# Patient Record
Sex: FEMALE | Race: WHITE | Employment: OTHER | URBAN - METROPOLITAN AREA
[De-identification: names, ages, dates, MRNs, and addresses within clinical notes are randomized per-mention and may not be internally consistent; named-entity substitution may affect disease eponyms.]

---

## 2018-01-15 ENCOUNTER — TELEPHONE (OUTPATIENT)
Dept: INTERNAL MEDICINE CLINIC | Age: 83
End: 2018-01-15

## 2018-03-19 ENCOUNTER — TELEPHONE (OUTPATIENT)
Dept: INTERNAL MEDICINE CLINIC | Age: 83
End: 2018-03-19

## 2018-03-20 ENCOUNTER — OFFICE VISIT (OUTPATIENT)
Dept: INTERNAL MEDICINE CLINIC | Age: 83
End: 2018-03-20

## 2018-03-20 VITALS
BODY MASS INDEX: 23.69 KG/M2 | SYSTOLIC BLOOD PRESSURE: 130 MMHG | HEART RATE: 72 BPM | HEIGHT: 57 IN | WEIGHT: 109.8 LBS | DIASTOLIC BLOOD PRESSURE: 72 MMHG

## 2018-03-20 DIAGNOSIS — Z13.31 POSITIVE DEPRESSION SCREENING: ICD-10-CM

## 2018-03-20 DIAGNOSIS — F33.2 SEVERE EPISODE OF RECURRENT MAJOR DEPRESSIVE DISORDER, WITHOUT PSYCHOTIC FEATURES (HCC): Primary | ICD-10-CM

## 2018-03-20 PROCEDURE — 1123F ACP DISCUSS/DSCN MKR DOCD: CPT | Performed by: INTERNAL MEDICINE

## 2018-03-20 PROCEDURE — G8431 POS CLIN DEPRES SCRN F/U DOC: HCPCS | Performed by: INTERNAL MEDICINE

## 2018-03-20 PROCEDURE — 1036F TOBACCO NON-USER: CPT | Performed by: INTERNAL MEDICINE

## 2018-03-20 PROCEDURE — G8484 FLU IMMUNIZE NO ADMIN: HCPCS | Performed by: INTERNAL MEDICINE

## 2018-03-20 PROCEDURE — 4040F PNEUMOC VAC/ADMIN/RCVD: CPT | Performed by: INTERNAL MEDICINE

## 2018-03-20 PROCEDURE — 99213 OFFICE O/P EST LOW 20 MIN: CPT | Performed by: INTERNAL MEDICINE

## 2018-03-20 PROCEDURE — G8427 DOCREV CUR MEDS BY ELIG CLIN: HCPCS | Performed by: INTERNAL MEDICINE

## 2018-03-20 PROCEDURE — G8420 CALC BMI NORM PARAMETERS: HCPCS | Performed by: INTERNAL MEDICINE

## 2018-03-20 PROCEDURE — 1090F PRES/ABSN URINE INCON ASSESS: CPT | Performed by: INTERNAL MEDICINE

## 2018-03-20 PROCEDURE — G0444 DEPRESSION SCREEN ANNUAL: HCPCS | Performed by: INTERNAL MEDICINE

## 2018-03-20 ASSESSMENT — PATIENT HEALTH QUESTIONNAIRE - PHQ9
1. LITTLE INTEREST OR PLEASURE IN DOING THINGS: 2
5. POOR APPETITE OR OVEREATING: 1
9. THOUGHTS THAT YOU WOULD BE BETTER OFF DEAD, OR OF HURTING YOURSELF: 3
4. FEELING TIRED OR HAVING LITTLE ENERGY: 0
8. MOVING OR SPEAKING SO SLOWLY THAT OTHER PEOPLE COULD HAVE NOTICED. OR THE OPPOSITE, BEING SO FIGETY OR RESTLESS THAT YOU HAVE BEEN MOVING AROUND A LOT MORE THAN USUAL: 0
6. FEELING BAD ABOUT YOURSELF - OR THAT YOU ARE A FAILURE OR HAVE LET YOURSELF OR YOUR FAMILY DOWN: 3
3. TROUBLE FALLING OR STAYING ASLEEP: 0
10. IF YOU CHECKED OFF ANY PROBLEMS, HOW DIFFICULT HAVE THESE PROBLEMS MADE IT FOR YOU TO DO YOUR WORK, TAKE CARE OF THINGS AT HOME, OR GET ALONG WITH OTHER PEOPLE: 2
7. TROUBLE CONCENTRATING ON THINGS, SUCH AS READING THE NEWSPAPER OR WATCHING TELEVISION: 3
2. FEELING DOWN, DEPRESSED OR HOPELESS: 3
SUM OF ALL RESPONSES TO PHQ QUESTIONS 1-9: 15
SUM OF ALL RESPONSES TO PHQ9 QUESTIONS 1 & 2: 5

## 2018-03-23 ENCOUNTER — TELEPHONE (OUTPATIENT)
Dept: INTERNAL MEDICINE CLINIC | Age: 83
End: 2018-03-23

## 2018-03-23 RX ORDER — QUETIAPINE FUMARATE 25 MG/1
25 TABLET, FILM COATED ORAL NIGHTLY
Qty: 30 TABLET | Refills: 1 | Status: SHIPPED | OUTPATIENT
Start: 2018-03-23
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
## 2018-03-26 ENCOUNTER — TELEPHONE (OUTPATIENT)
Dept: INTERNAL MEDICINE CLINIC | Age: 83
End: 2018-03-26

## 2018-04-05 ENCOUNTER — TELEPHONE (OUTPATIENT)
Dept: INTERNAL MEDICINE CLINIC | Age: 83
End: 2018-04-05

## 2018-04-09 ENCOUNTER — OFFICE VISIT (OUTPATIENT)
Dept: INTERNAL MEDICINE CLINIC | Age: 83
End: 2018-04-09

## 2018-04-09 VITALS
SYSTOLIC BLOOD PRESSURE: 122 MMHG | DIASTOLIC BLOOD PRESSURE: 60 MMHG | HEIGHT: 57 IN | HEART RATE: 84 BPM | WEIGHT: 103.8 LBS | BODY MASS INDEX: 22.39 KG/M2

## 2018-04-09 DIAGNOSIS — F03.91 DEMENTIA WITH BEHAVIORAL DISTURBANCE, UNSPECIFIED DEMENTIA TYPE: Primary | ICD-10-CM

## 2018-04-09 DIAGNOSIS — E21.3 HYPERPARATHYROIDISM (HCC): ICD-10-CM

## 2018-04-09 DIAGNOSIS — F33.1 MODERATE EPISODE OF RECURRENT MAJOR DEPRESSIVE DISORDER (HCC): ICD-10-CM

## 2018-04-09 PROCEDURE — 1036F TOBACCO NON-USER: CPT | Performed by: INTERNAL MEDICINE

## 2018-04-09 PROCEDURE — 1123F ACP DISCUSS/DSCN MKR DOCD: CPT | Performed by: INTERNAL MEDICINE

## 2018-04-09 PROCEDURE — 99214 OFFICE O/P EST MOD 30 MIN: CPT | Performed by: INTERNAL MEDICINE

## 2018-04-09 PROCEDURE — 1090F PRES/ABSN URINE INCON ASSESS: CPT | Performed by: INTERNAL MEDICINE

## 2018-04-09 PROCEDURE — 4040F PNEUMOC VAC/ADMIN/RCVD: CPT | Performed by: INTERNAL MEDICINE

## 2018-04-09 PROCEDURE — G8420 CALC BMI NORM PARAMETERS: HCPCS | Performed by: INTERNAL MEDICINE

## 2018-04-09 PROCEDURE — G8427 DOCREV CUR MEDS BY ELIG CLIN: HCPCS | Performed by: INTERNAL MEDICINE

## 2018-04-09 ASSESSMENT — ENCOUNTER SYMPTOMS
COUGH: 1
CONSTIPATION: 0

## 2018-05-03 ENCOUNTER — TELEPHONE (OUTPATIENT)
Dept: INTERNAL MEDICINE CLINIC | Age: 83
End: 2018-05-03

## 2018-10-26 ENCOUNTER — TELEPHONE (OUTPATIENT)
Dept: INTERNAL MEDICINE CLINIC | Age: 83
End: 2018-10-26

## 2018-10-26 NOTE — TELEPHONE ENCOUNTER
FLACO call from Linda Stearns states that pt have cellulitis on left lower leg, it is infected,warm, red and swollen. She states she may need antibiotic, or dressing???    She request call back.

## 2019-06-21 ENCOUNTER — APPOINTMENT (OUTPATIENT)
Dept: GENERAL RADIOLOGY | Age: 84
DRG: 536 | End: 2019-06-21
Payer: MEDICARE

## 2019-06-21 ENCOUNTER — APPOINTMENT (OUTPATIENT)
Dept: CT IMAGING | Age: 84
DRG: 536 | End: 2019-06-21
Payer: MEDICARE

## 2019-06-21 ENCOUNTER — TELEPHONE (OUTPATIENT)
Dept: ORTHOPEDIC SURGERY | Age: 84
End: 2019-06-21

## 2019-06-21 ENCOUNTER — HOSPITAL ENCOUNTER (INPATIENT)
Age: 84
LOS: 5 days | Discharge: NURSING, REHAB OR LONG-TERM CARE | DRG: 536 | End: 2019-06-26
Attending: FAMILY MEDICINE | Admitting: INTERNAL MEDICINE
Payer: MEDICARE

## 2019-06-21 DIAGNOSIS — S32.10XA CLOSED FRACTURE OF SACRUM, UNSPECIFIED PORTION OF SACRUM, INITIAL ENCOUNTER (HCC): ICD-10-CM

## 2019-06-21 DIAGNOSIS — M54.40 LOW BACK PAIN WITH SCIATICA, SCIATICA LATERALITY UNSPECIFIED, UNSPECIFIED BACK PAIN LATERALITY, UNSPECIFIED CHRONICITY: ICD-10-CM

## 2019-06-21 DIAGNOSIS — M25.561 ACUTE PAIN OF RIGHT KNEE: ICD-10-CM

## 2019-06-21 DIAGNOSIS — M25.522 LEFT ELBOW PAIN: ICD-10-CM

## 2019-06-21 DIAGNOSIS — Z92.29 PERSONAL HISTORY OF OTHER DRUG THERAPY: ICD-10-CM

## 2019-06-21 DIAGNOSIS — R52 UNCONTROLLED PAIN: ICD-10-CM
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
## 2019-06-21 DIAGNOSIS — S32.592A CLOSED FRACTURE OF SINGLE RAMUS OF LEFT PUBIS, INITIAL ENCOUNTER (HCC): ICD-10-CM

## 2019-06-21 DIAGNOSIS — M81.0 SENILE OSTEOPOROSIS: ICD-10-CM

## 2019-06-21 DIAGNOSIS — S62.102A CLOSED FRACTURE OF LEFT WRIST, INITIAL ENCOUNTER: ICD-10-CM

## 2019-06-21 PROBLEM — S32.89XA FRACTURE OF OTHER PARTS OF PELVIS, INITIAL ENCOUNTER FOR CLOSED FRACTURE (HCC): Status: ACTIVE | Noted: 2019-06-21

## 2019-06-21 LAB
A/G RATIO: 1.3 (ref 1.1–2.2)
ABO/RH: NORMAL
ALBUMIN SERPL-MCNC: 3.9 G/DL (ref 3.4–5)
ALP BLD-CCNC: 67 U/L (ref 40–129)
ALT SERPL-CCNC: 16 U/L (ref 10–40)
ANION GAP SERPL CALCULATED.3IONS-SCNC: 11 MMOL/L (ref 3–16)
ANTIBODY SCREEN: NORMAL
AST SERPL-CCNC: 26 U/L (ref 15–37)
BASOPHILS ABSOLUTE: 0 K/UL (ref 0–0.2)
BASOPHILS RELATIVE PERCENT: 0.2 %
BILIRUB SERPL-MCNC: <0.2 MG/DL (ref 0–1)
BILIRUBIN URINE: NEGATIVE
BLOOD, URINE: NEGATIVE
BUN BLDV-MCNC: 31 MG/DL (ref 7–20)
CALCIUM SERPL-MCNC: 9.1 MG/DL (ref 8.3–10.6)
CHLORIDE BLD-SCNC: 104 MMOL/L (ref 99–110)
CLARITY: CLEAR
CO2: 21 MMOL/L (ref 21–32)
COLOR: YELLOW
CREAT SERPL-MCNC: 1.2 MG/DL (ref 0.6–1.2)
EKG ATRIAL RATE: 74 BPM
EKG DIAGNOSIS: NORMAL
EKG P AXIS: -2 DEGREES
EKG P-R INTERVAL: 216 MS
EKG Q-T INTERVAL: 430 MS
EKG QRS DURATION: 126 MS
EKG QTC CALCULATION (BAZETT): 477 MS
EKG R AXIS: 75 DEGREES
EKG T AXIS: 30 DEGREES
EKG VENTRICULAR RATE: 74 BPM
EOSINOPHILS ABSOLUTE: 0 K/UL (ref 0–0.6)
EOSINOPHILS RELATIVE PERCENT: 0.1 %
GFR AFRICAN AMERICAN: 50
GFR NON-AFRICAN AMERICAN: 41
GLOBULIN: 3 G/DL
GLUCOSE BLD-MCNC: 137 MG/DL (ref 70–99)
GLUCOSE URINE: NEGATIVE MG/DL
HCT VFR BLD CALC: 34.6 % (ref 36–48)
HEMOGLOBIN: 11.3 G/DL (ref 12–16)
KETONES, URINE: NEGATIVE MG/DL
LEUKOCYTE ESTERASE, URINE: NEGATIVE
LYMPHOCYTES ABSOLUTE: 0.9 K/UL (ref 1–5.1)
LYMPHOCYTES RELATIVE PERCENT: 10.1 %
MCH RBC QN AUTO: 29.8 PG (ref 26–34)
MCHC RBC AUTO-ENTMCNC: 32.6 G/DL (ref 31–36)
MCV RBC AUTO: 91.4 FL (ref 80–100)
MICROSCOPIC EXAMINATION: NORMAL
MONOCYTES ABSOLUTE: 0.6 K/UL (ref 0–1.3)
MONOCYTES RELATIVE PERCENT: 7.3 %
NEUTROPHILS ABSOLUTE: 7.2 K/UL (ref 1.7–7.7)
NEUTROPHILS RELATIVE PERCENT: 82.3 %
NITRITE, URINE: NEGATIVE
PDW BLD-RTO: 15.2 % (ref 12.4–15.4)
PH UA: 5.5 (ref 5–8)
PLATELET # BLD: 203 K/UL (ref 135–450)
PMV BLD AUTO: 8 FL (ref 5–10.5)
POTASSIUM SERPL-SCNC: 4.8 MMOL/L (ref 3.5–5.1)
PROTEIN UA: NEGATIVE MG/DL
RBC # BLD: 3.79 M/UL (ref 4–5.2)
SODIUM BLD-SCNC: 136 MMOL/L (ref 136–145)
SPECIFIC GRAVITY UA: 1.01 (ref 1–1.03)
TOTAL PROTEIN: 6.9 G/DL (ref 6.4–8.2)
TROPONIN: <0.01 NG/ML
URINE REFLEX TO CULTURE: NORMAL
URINE TYPE: NORMAL
UROBILINOGEN, URINE: 0.2 E.U./DL
WBC # BLD: 8.7 K/UL (ref 4–11)

## 2019-06-21 PROCEDURE — 73110 X-RAY EXAM OF WRIST: CPT

## 2019-06-21 PROCEDURE — 2580000003 HC RX 258: Performed by: INTERNAL MEDICINE

## 2019-06-21 PROCEDURE — 73560 X-RAY EXAM OF KNEE 1 OR 2: CPT

## 2019-06-21 PROCEDURE — 92526 ORAL FUNCTION THERAPY: CPT

## 2019-06-21 PROCEDURE — 2580000003 HC RX 258: Performed by: FAMILY MEDICINE

## 2019-06-21 PROCEDURE — 84484 ASSAY OF TROPONIN QUANT: CPT

## 2019-06-21 PROCEDURE — 93010 ELECTROCARDIOGRAM REPORT: CPT | Performed by: INTERNAL MEDICINE

## 2019-06-21 PROCEDURE — 6370000000 HC RX 637 (ALT 250 FOR IP): Performed by: INTERNAL MEDICINE

## 2019-06-21 PROCEDURE — 73080 X-RAY EXAM OF ELBOW: CPT

## 2019-06-21 PROCEDURE — 85025 COMPLETE CBC W/AUTO DIFF WBC: CPT

## 2019-06-21 PROCEDURE — 86901 BLOOD TYPING SEROLOGIC RH(D): CPT

## 2019-06-21 PROCEDURE — 6370000000 HC RX 637 (ALT 250 FOR IP): Performed by: FAMILY MEDICINE

## 2019-06-21 PROCEDURE — 99285 EMERGENCY DEPT VISIT HI MDM: CPT

## 2019-06-21 PROCEDURE — 80053 COMPREHEN METABOLIC PANEL: CPT

## 2019-06-21 PROCEDURE — 92610 EVALUATE SWALLOWING FUNCTION: CPT

## 2019-06-21 PROCEDURE — 86900 BLOOD TYPING SEROLOGIC ABO: CPT

## 2019-06-21 PROCEDURE — 81003 URINALYSIS AUTO W/O SCOPE: CPT

## 2019-06-21 PROCEDURE — 73502 X-RAY EXAM HIP UNI 2-3 VIEWS: CPT

## 2019-06-21 PROCEDURE — 1200000000 HC SEMI PRIVATE

## 2019-06-21 PROCEDURE — 72192 CT PELVIS W/O DYE: CPT

## 2019-06-21 PROCEDURE — 93005 ELECTROCARDIOGRAM TRACING: CPT | Performed by: FAMILY MEDICINE

## 2019-06-21 PROCEDURE — 86850 RBC ANTIBODY SCREEN: CPT

## 2019-06-21 RX ORDER — ALPRAZOLAM 0.5 MG/1
0.5 TABLET ORAL ONCE
Status: COMPLETED | OUTPATIENT
Start: 2019-06-21 | End: 2019-06-21

## 2019-06-21 RX ORDER — ACETAMINOPHEN 325 MG/1
650 TABLET ORAL EVERY 6 HOURS PRN
Status: DISCONTINUED | OUTPATIENT
Start: 2019-06-21 | End: 2019-06-26 | Stop reason: HOSPADM

## 2019-06-21 RX ORDER — CYCLOBENZAPRINE HCL 10 MG
5 TABLET ORAL ONCE
Status: COMPLETED | OUTPATIENT
Start: 2019-06-21 | End: 2019-06-21

## 2019-06-21 RX ORDER — 0.9 % SODIUM CHLORIDE 0.9 %
1000 INTRAVENOUS SOLUTION INTRAVENOUS ONCE
Status: COMPLETED | OUTPATIENT
Start: 2019-06-21 | End: 2019-06-21

## 2019-06-21 RX ORDER — CALCIUM CARBONATE 200(500)MG
1 TABLET,CHEWABLE ORAL 3 TIMES DAILY PRN
Status: DISCONTINUED | OUTPATIENT
Start: 2019-06-21 | End: 2019-06-26 | Stop reason: HOSPADM

## 2019-06-21 RX ORDER — BRIMONIDINE TARTRATE, TIMOLOL MALEATE 2; 5 MG/ML; MG/ML
1 SOLUTION/ DROPS OPHTHALMIC EVERY 12 HOURS
Status: DISCONTINUED | OUTPATIENT
Start: 2019-06-21 | End: 2019-06-21 | Stop reason: CLARIF

## 2019-06-21 RX ORDER — QUETIAPINE FUMARATE 25 MG/1
25 TABLET, FILM COATED ORAL NIGHTLY
Status: DISCONTINUED | OUTPATIENT
Start: 2019-06-21 | End: 2019-06-26 | Stop reason: HOSPADM

## 2019-06-21 RX ORDER — MORPHINE SULFATE 4 MG/ML
4 INJECTION, SOLUTION INTRAMUSCULAR; INTRAVENOUS EVERY 30 MIN PRN
Status: DISCONTINUED | OUTPATIENT
Start: 2019-06-21 | End: 2019-06-26 | Stop reason: HOSPADM

## 2019-06-21 RX ORDER — LATANOPROST 50 UG/ML
1 SOLUTION/ DROPS OPHTHALMIC NIGHTLY
Status: DISCONTINUED | OUTPATIENT
Start: 2019-06-21 | End: 2019-06-26 | Stop reason: HOSPADM

## 2019-06-21 RX ORDER — TIMOLOL MALEATE 5 MG/ML
1 SOLUTION/ DROPS OPHTHALMIC 2 TIMES DAILY
Status: DISCONTINUED | OUTPATIENT
Start: 2019-06-21 | End: 2019-06-26 | Stop reason: HOSPADM

## 2019-06-21 RX ORDER — HYDROCODONE BITARTRATE AND ACETAMINOPHEN 5; 325 MG/1; MG/1
1 TABLET ORAL ONCE
Status: COMPLETED | OUTPATIENT
Start: 2019-06-21 | End: 2019-06-21

## 2019-06-21 RX ORDER — HYDROMORPHONE HYDROCHLORIDE 1 MG/ML
0.5 INJECTION, SOLUTION INTRAMUSCULAR; INTRAVENOUS; SUBCUTANEOUS EVERY 4 HOURS PRN
Status: DISCONTINUED | OUTPATIENT
Start: 2019-06-21 | End: 2019-06-26 | Stop reason: HOSPADM

## 2019-06-21 RX ORDER — SENNOSIDES 8.6 MG
650 CAPSULE ORAL EVERY 6 HOURS PRN
COMMUNITY

## 2019-06-21 RX ORDER — BRIMONIDINE TARTRATE 2 MG/ML
1 SOLUTION/ DROPS OPHTHALMIC 2 TIMES DAILY
Status: DISCONTINUED | OUTPATIENT
Start: 2019-06-21 | End: 2019-06-26 | Stop reason: HOSPADM

## 2019-06-21 RX ORDER — BRIMONIDINE TARTRATE, TIMOLOL MALEATE 2; 5 MG/ML; MG/ML
1 SOLUTION/ DROPS OPHTHALMIC EVERY 12 HOURS
COMMUNITY

## 2019-06-21 RX ORDER — ONDANSETRON 2 MG/ML
4 INJECTION INTRAMUSCULAR; INTRAVENOUS EVERY 6 HOURS PRN
Status: DISCONTINUED | OUTPATIENT
Start: 2019-06-21 | End: 2019-06-26 | Stop reason: HOSPADM

## 2019-06-21 RX ORDER — SODIUM CHLORIDE 9 MG/ML
INJECTION, SOLUTION INTRAVENOUS CONTINUOUS
Status: DISCONTINUED | OUTPATIENT
Start: 2019-06-21 | End: 2019-06-23

## 2019-06-21 RX ORDER — ONDANSETRON 4 MG/1
4 TABLET, ORALLY DISINTEGRATING ORAL ONCE
Status: COMPLETED | OUTPATIENT
Start: 2019-06-21 | End: 2019-06-21

## 2019-06-21 RX ORDER — TRAMADOL HYDROCHLORIDE 50 MG/1
50 TABLET ORAL EVERY 6 HOURS PRN
Status: DISCONTINUED | OUTPATIENT
Start: 2019-06-21 | End: 2019-06-26 | Stop reason: HOSPADM

## 2019-06-21 RX ADMIN — ALPRAZOLAM 0.5 MG: 0.5 TABLET ORAL at 06:54

## 2019-06-21 RX ADMIN — SODIUM CHLORIDE: 9 INJECTION, SOLUTION INTRAVENOUS at 13:28

## 2019-06-21 RX ADMIN — QUETIAPINE FUMARATE 25 MG: 25 TABLET ORAL at 23:15

## 2019-06-21 RX ADMIN — LATANOPROST 1 DROP: 50 SOLUTION OPHTHALMIC at 20:43

## 2019-06-21 RX ADMIN — CYCLOBENZAPRINE HYDROCHLORIDE 5 MG: 10 TABLET, FILM COATED ORAL at 06:54

## 2019-06-21 RX ADMIN — HYDROCODONE BITARTRATE AND ACETAMINOPHEN 1 TABLET: 5; 325 TABLET ORAL at 04:29

## 2019-06-21 RX ADMIN — SODIUM CHLORIDE 1000 ML: 9 INJECTION, SOLUTION INTRAVENOUS at 06:54

## 2019-06-21 RX ADMIN — BRIMONIDINE TARTRATE 1 DROP: 2 SOLUTION OPHTHALMIC at 20:48

## 2019-06-21 RX ADMIN — ONDANSETRON 4 MG: 4 TABLET, ORALLY DISINTEGRATING ORAL at 05:50

## 2019-06-21 RX ADMIN — TIMOLOL MALEATE 1 DROP: 5 SOLUTION OPHTHALMIC at 20:48

## 2019-06-21 RX ADMIN — TRAMADOL HYDROCHLORIDE 50 MG: 50 TABLET, FILM COATED ORAL at 19:32

## 2019-06-21 ASSESSMENT — PAIN SCALES - GENERAL
PAINLEVEL_OUTOF10: 6
PAINLEVEL_OUTOF10: 0
PAINLEVEL_OUTOF10: 6
PAINLEVEL_OUTOF10: 0
PAINLEVEL_OUTOF10: 7
PAINLEVEL_OUTOF10: 0
PAINLEVEL_OUTOF10: 0

## 2019-06-21 NOTE — ED NOTES
Nursing report given to 4 Brianna RN with no questions or concerns.      Stacey Anton, RN  06/21/19 [id number]

## 2019-06-21 NOTE — ED PROVIDER NOTES
I independently examined and evaluated Kathleen Lambert. In brief, 69-year-old female who rolled out of bed onto her left side. On initial presentation her only complaint was left elbow, left wrist, and left hip pain. She had been at her baseline state of health. She denied back pain. She denies headache. She denied nausea vomiting diarrhea constipation. After initial set of x-rays were done patient now complaining of right knee pain as well. Focused exam revealed normal vital signs. Hard of hearing but pleasant. Alert and oriented x3. No facial trauma austin sign or raccoon sign. Bruising of her left shoulder. Tender to palpation over her left wrist.  Strong and symmetrical radial pulses. Tender left hip with no shortening. Strong and symmetrical dorsalis pedis pulses and normal capillary refill. Right knee now unable to be straightened    ED course: Left wrist fracture. Discussed with Ortho. Nonoperative. Neurovascularly intact. Volar splint was placed and on repeat evaluation she remains neurologically intact with normal finger sensation and good capillary refill. Hip x-ray with question of pubic rami fracture and patient with increased pain and inability to ambulate secondary to knee pain so will require admission. CT pelvis does demonstrate superior ramus fracture, inferior pubic ramus fracture, left sacrum fracture, and left iliac crest fracture. No hip fracture. N.p.o., IV hydration, Leggett placed, initially Norco Xanax and Flexeril but with continued pain IV fentanyl. X-ray and troponin and preop labs and EKG all ordered. Patient will be admitted to Dr. Albin Saucedo with orthopedic consultation. In total critical care time of 20 minutes with muscle skeletal, cardiac, renal system or acute risk of decompensation and collapse to include frequent bedside repeat evaluations as additional complaints became evident and ongoing complicated evaluation was required.   This is exclusion of any billable procedures. All diagnostic, treatment, and disposition decisions were made by myself in conjunction with the advanced practice provider. For all further details of the patient's emergency department visit, please see the advanced practice provider's documentation. Comment: Please note this report has been produced using speech recognition software and may contain errors related to that system including errors in grammar, punctuation, and spelling, as well as words and phrases that may be inappropriate. If there are any questions or concerns please feel free to contact the dictating provider for clarification.       Juan Diego Freire MD  06/21/19 [address], [place]  06/22/19 1976

## 2019-06-21 NOTE — CARE COORDINATION
Discharge Planning Assessment    SW discharge planner met with patient and family member to discuss reason for admission, current living situation, and potential needs at the time of discharge    Demographics/Insurance verified Yes/No   Yes    Current type of dwelling: Resides in Assisted Living apartment at [facility]. charles last April    Patient from ECF/SW confirmed with: Family    Living arrangements: Lived alone in apartment    Level of function/Support: Assistance needed. Has demential    PCP:Dr. Bart Birmingham    Last Visit to PCP:4/9/49    DME: [address] [facility] equipped apartment    Active with any community resources/agencies/skilled home care:  No    Medication compliance issues: with assistance    Financial issues that could impact healthcare: no    Transportation at the time of discharge: to be determined    Tentative discharge plan: Patient was residing in an [id number] at [facility]. Patient has dementia. Family has decided not to pursue surgical intervention due to her age an dementia. Referral sent to [facility]. Await response. Will need precert. Patient will continue to be followed for support and discharge planning.      Electronically signed by CIRO Felix on 6/21/2019 at 3:55 PM

## 2019-06-21 NOTE — ED NOTES
Pt. To floor with IV fluids infusing. Floor RN made aware in report that pt's. BP was running low although pt. Remains alert and oriented and RN stated it was ok to send pt. Upstairs.       Leonard Enamorado RN  06/21/19 1319

## 2019-06-21 NOTE — PROGRESS NOTES
[facility] Orthopedic Surgery  Consult Note          Orthopedic Consult, full note to follow. Luiz Looney 80 y.o. admitted for a fall. CT pelvis and Xray left wrist reviewed, and showed left distal radius fracture and left pubic rami and sacral fracture. Plan:  - Admission.  - PT/OT, WBAT left hip and elbow with platform walker. Thank you very much for the kind consultation and allowing me to participate in this patient's care. I will continue to keep you apprised of her progress.            Lobito Etienne MD 6/21/2019 7:32 AM

## 2019-06-21 NOTE — ED PROVIDER NOTES
severe fracture repair         CURRENTMEDICATIONS       Previous Medications    ACETAMINOPHEN (TYLENOL) 650 MG EXTENDED RELEASE TABLET    Take 650 mg by mouth every 6 hours as needed for Pain    BIMATOPROST (LUMIGAN) 0.01 % SOLN OPHTHALMIC DROPS    Place 1 drop into both eyes nightly    BRIMONIDINE-TIMOLOL (COMBIGAN) 0.2-0.5 % OPHTHALMIC SOLUTION    Place 1 drop into the right eye every 12 hours     CALCIUM CARBONATE (TUMS) 500 MG CHEWABLE TABLET    Take 1 tablet by mouth 3 times daily as needed     HYDROCORTISONE 2.5 % CREAM    Apply topically 2 times daily To hemorrhoid as needed    LORATADINE (CLARITIN) 10 MG TABLET    Take 10 mg by mouth daily.       QUETIAPINE (SEROQUEL) 25 MG TABLET    Take 1 tablet by mouth nightly    SERTRALINE (ZOLOFT) 50 MG TABLET    Take 1.5 tablets by mouth daily         ALLERGIES     Cefzil [cefprozil] and Pcn [penicillins]    FAMILYHISTORY       Family History   Problem Relation Age of Onset    Cancer Father         pancreatic    Alcohol Abuse Sister     Depression Sister     Hypertension Sister     Heart Disease Sister           SOCIAL HISTORY       Social History     Socioeconomic History    Marital status: Single     Spouse name: None    Number of children: None    Years of education: None    Highest education level: None   Occupational History    None   Social Needs    Financial resource strain: None    Food insecurity:     Worry: None     Inability: None    Transportation needs:     Medical: None     Non-medical: None   Tobacco Use    Smoking status: Never Smoker    Smokeless tobacco: Never Used   Substance and Sexual Activity    Alcohol use: No    Drug use: No    Sexual activity: Never   Lifestyle    Physical activity:     Days per week: None     Minutes per session: None    Stress: None   Relationships    Social connections:     Talks on phone: None     Gets together: None     Attends Yarsani service: None     Active member of club or organization: None Attends meetings of clubs or organizations: None     Relationship status: None    Intimate partner violence:     Fear of current or ex partner: None     Emotionally abused: None     Physically abused: None     Forced sexual activity: None   Other Topics Concern    None   Social History Narrative    None       SCREENINGS             PHYSICAL EXAM    (up to 7 for level 4, 8 or more for level 5)     ED Triage Vitals   BP Temp Temp Source Pulse Resp SpO2 Height Weight   06/21/19 0401 06/21/19 0401 06/21/19 0401 06/21/19 0401 06/21/19 0401 06/21/19 0401 06/21/19 0404 06/21/19 0404   (!) 140/48 97.7 °F (36.5 °C) Oral 69 12 (!) 85 % 4' 9\" (1.448 m) 104 lb (47.2 kg)       Physical Exam   Constitutional: She appears well-developed and well-nourished. HENT:   Head: Atraumatic. Eyes: Right eye exhibits no discharge. Left eye exhibits no discharge. Neck: Normal range of motion. Cardiovascular: Normal rate, regular rhythm and normal heart sounds. Exam reveals no gallop and no friction rub. No murmur heard. Pulmonary/Chest: Effort normal and breath sounds normal. No stridor. No respiratory distress. She has no wheezes. She has no rales. Abdominal: Soft. Bowel sounds are normal. She exhibits no distension and no mass. There is no tenderness. There is no rebound and no guarding. No hernia. Musculoskeletal: She exhibits edema and tenderness. Tenderness with edema over the left wrist.  Decreased range of motion with flexion of the left hip with pain with logrolling of left hip. Some decreased range of motion of flexion with the right knee. No midline tenderness or step-off in the neck or back. No other deformity or sign of injury noted. Neurological: She is alert. No cranial nerve deficit. Skin: Skin is warm and dry. No rash noted. She is not diaphoretic. No erythema. Psychiatric: She has a normal mood and affect. Her behavior is normal.   Nursing note and vitals reviewed.       DIAGNOSTIC RESULTS LABS:    Labs Reviewed   CBC WITH AUTO DIFFERENTIAL - Abnormal; Notable for the following components:       Result Value    RBC 3.79 (*)     Hemoglobin 11.3 (*)     Hematocrit 34.6 (*)     Lymphocytes # 0.9 (*)     All other components within normal limits    Narrative:     Performed at:  [facility]  [address], [address]   Phone [phone]   COMPREHENSIVE METABOLIC PANEL - Abnormal; Notable for the following components:    Glucose 137 (*)     BUN 31 (*)     GFR Non- 41 (*)     GFR  50 (*)     All other components within normal limits    Narrative:     Performed at:  [facility] [address], [address]   Phone [phone]   URINE RT REFLEX TO CULTURE    Narrative:     Performed at:  [facility] [address], [address]   Phone [phone]   TROPONIN    Narrative:     Performed at:  [facility] [address], [address]   Phone [phone]   TYPE AND SCREEN    Narrative:     Performed at:  [facility] [address], [place]   Phone [phone]       All other labs were within normal range or not returned as of this dictation. EKG: All EKG's are interpreted by the Emergency Department Physician who either signs orCo-signs this chart in the absence of a cardiologist.  Please see their note for interpretation of EKG.       RADIOLOGY:   Non-plain film images such as CT, Ultrasound and MRI are read by the radiologist. Plain radiographic images are visualized andpreliminarily interpreted by the  ED Provider with the below findings:        Interpretation perthe Radiologist below, if available at the time of this note:    CT PELVIS WO CONTRAST Additional Contrast? None   Final Result   There is a comminuted mildly displaced fracture involving the left superior   ramus. Mild contusion is seen adjacent to this. No free fluid or focal   fluid collection. Nondisplaced posterior left inferior pubic ramus fracture. Nondisplaced vertical fracture through the left sacrum. Nondisplaced fracture involving the most medial aspect of the left iliac   crest and probable fracture involving the right iliac wing. XR KNEE RIGHT (1-2 VIEWS)   Final Result   Advanced arthritic changes. Chondrocalcinosis. No acute fracture,   dislocation, or effusion is noted although mild prepatellar soft tissue   swelling is seen. Patella is high riding. XR ELBOW LEFT (MIN 3 VIEWS)   Final Result   Pelvis and left hip: Patient is rotated toward the left limiting assessment. Irregularity of the left superior and inferior pubic rami are noted. Inferior pubic rami irregularity appears to be attributable to an acute   fracture. Superior pubic rami all fracture shows sclerosis, age   indeterminate. Additional views of the pelvis are advised. Please see below. Left wrist: Comminuted intra-articular impacted fracture of the distal radius   with soft tissue swelling. Fracture of the distal ulnar shaft with ventral   angulation. Nondisplaced ulnar styloid process fracture. Left elbow: Osteopenia. No acute fracture dislocation. Dorsal soft tissue   irregularity consistent with contusion or laceration. RECOMMENDATION:   Additional films of the pelvis to include inlet and outlet views without   rotation are advised. XR HIP 2-3 VW W PELVIS LEFT   Final Result   Pelvis and left hip: Patient is rotated toward the left limiting assessment. Irregularity of the left superior and inferior pubic rami are noted. Inferior pubic rami irregularity appears to be attributable to an acute   fracture. Superior pubic rami all fracture shows sclerosis, age   indeterminate. Additional views of the pelvis are advised. Please see below.       Left wrist: Comminuted intra-articular impacted fracture of the distal radius   with soft tissue swelling. Fracture of the distal ulnar shaft with ventral   angulation. Nondisplaced ulnar styloid process fracture. Left elbow: Osteopenia. No acute fracture dislocation. Dorsal soft tissue   irregularity consistent with contusion or laceration. RECOMMENDATION:   Additional films of the pelvis to include inlet and outlet views without   rotation are advised. XR WRIST LEFT (MIN 3 VIEWS)   Final Result   Pelvis and left hip: Patient is rotated toward the left limiting assessment. Irregularity of the left superior and inferior pubic rami are noted. Inferior pubic rami irregularity appears to be attributable to an acute   fracture. Superior pubic rami all fracture shows sclerosis, age   indeterminate. Additional views of the pelvis are advised. Please see below. Left wrist: Comminuted intra-articular impacted fracture of the distal radius   with soft tissue swelling. Fracture of the distal ulnar shaft with ventral   angulation. Nondisplaced ulnar styloid process fracture. Left elbow: Osteopenia. No acute fracture dislocation. Dorsal soft tissue   irregularity consistent with contusion or laceration. RECOMMENDATION:   Additional films of the pelvis to include inlet and outlet views without   rotation are advised. Xr Elbow Left (min 3 Views)    Result Date: 6/21/2019  EXAMINATION: ONE XRAY VIEW OF THE PELVIS AND TWO XRAY VIEWS LEFT HIP; 3 XRAY VIEWS OF THE LEFT WRIST; 3 XRAY VIEWS OF THE LEFT ELBOW 6/21/2019 5:08 am COMPARISON: None. HISTORY: ORDERING SYSTEM PROVIDED HISTORY: trauma TECHNOLOGIST PROVIDED HISTORY: Portable- left hip Reason for exam:->trauma Ordering Physician Provided Reason for Exam: Fall. Pain to left wrist, left hip, left elbow. Acuity: Unknown Type of Exam: Unknown FINDINGS: Pelvis and left hip: Rotation toward the left somewhat limits assessment.  Both femoral heads are well circumscribed and situated within the acetabula. No hip dislocation is evident. Irregularity of the left superior and inferior pubic rami is noted. There appears be a fresh fracture of the left inferior pubic ramus. Overlap of the superior pubic ramus on the left is noted, complicating assessment. SI joints are symmetrical.  Additional views of the pelvis are advised. Degenerative and degenerative disc changes in the lumbar spine are noted. Left wrist: Osteopenia limits assessment. Patient has comminuted impacted intra-articular fracture of the distal radius. A displaced fracture of the distal ulnar shaft is noted with ventral angulation and impaction. Nondisplaced ulnar styloid process fracture is seen. Associated soft tissue swelling is seen. The navicular lunate space is well-preserved. No ulnar minus variance is noted. Left elbow: Osteopenia is present. No acute fracture or dislocation is noted. Soft tissue irregularity over the dorsum of the elbow is noted, consistent with laceration or contusion. No radiopaque foreign body is noted. Pelvis and left hip: Patient is rotated toward the left limiting assessment. Irregularity of the left superior and inferior pubic rami are noted. Inferior pubic rami irregularity appears to be attributable to an acute fracture. Superior pubic rami all fracture shows sclerosis, age indeterminate. Additional views of the pelvis are advised. Please see below. Left wrist: Comminuted intra-articular impacted fracture of the distal radius with soft tissue swelling. Fracture of the distal ulnar shaft with ventral angulation. Nondisplaced ulnar styloid process fracture. Left elbow: Osteopenia. No acute fracture dislocation. Dorsal soft tissue irregularity consistent with contusion or laceration. RECOMMENDATION: Additional films of the pelvis to include inlet and outlet views without rotation are advised.      Xr Wrist Left (min 3 Views)    Result Date: 6/21/2019  EXAMINATION: ONE XRAY VIEW OF THE PELVIS AND TWO XRAY VIEWS LEFT HIP; 3 XRAY VIEWS OF THE LEFT WRIST; 3 XRAY VIEWS OF THE LEFT ELBOW 6/21/2019 5:08 am COMPARISON: None. HISTORY: ORDERING SYSTEM PROVIDED HISTORY: trauma TECHNOLOGIST PROVIDED HISTORY: Portable- left hip Reason for exam:->trauma Ordering Physician Provided Reason for Exam: Fall. Pain to left wrist, left hip, left elbow. Acuity: Unknown Type of Exam: Unknown FINDINGS: Pelvis and left hip: Rotation toward the left somewhat limits assessment. Both femoral heads are well circumscribed and situated within the acetabula. No hip dislocation is evident. Irregularity of the left superior and inferior pubic rami is noted. There appears be a fresh fracture of the left inferior pubic ramus. Overlap of the superior pubic ramus on the left is noted, complicating assessment. SI joints are symmetrical.  Additional views of the pelvis are advised. Degenerative and degenerative disc changes in the lumbar spine are noted. Left wrist: Osteopenia limits assessment. Patient has comminuted impacted intra-articular fracture of the distal radius. A displaced fracture of the distal ulnar shaft is noted with ventral angulation and impaction. Nondisplaced ulnar styloid process fracture is seen. Associated soft tissue swelling is seen. The navicular lunate space is well-preserved. No ulnar minus variance is noted. Left elbow: Osteopenia is present. No acute fracture or dislocation is noted. Soft tissue irregularity over the dorsum of the elbow is noted, consistent with laceration or contusion. No radiopaque foreign body is noted. Pelvis and left hip: Patient is rotated toward the left limiting assessment. Irregularity of the left superior and inferior pubic rami are noted. Inferior pubic rami irregularity appears to be attributable to an acute fracture. Superior pubic rami all fracture shows sclerosis, age indeterminate. Additional views of the pelvis are advised. Please see below. Left wrist: Comminuted intra-articular impacted fracture of the distal radius with soft tissue swelling. Fracture of the distal ulnar shaft with ventral angulation. Nondisplaced ulnar styloid process fracture. Left elbow: Osteopenia. No acute fracture dislocation. Dorsal soft tissue irregularity consistent with contusion or laceration. RECOMMENDATION: Additional films of the pelvis to include inlet and outlet views without rotation are advised. Xr Knee Right (1-2 Views)    Result Date: 6/21/2019  EXAMINATION: XRAY VIEWS OF THE RIGHT KNEE 6/21/2019 6:14 am COMPARISON: None. HISTORY: ORDERING SYSTEM PROVIDED HISTORY: pain TECHNOLOGIST PROVIDED HISTORY: Reason for exam:->pain Ordering Physician Provided Reason for Exam: fell Acuity: Acute Type of Exam: Initial FINDINGS: Osteopenia complicates assessment. The patient has moderately severe tricompartmental arthritic changes with marginal spurring and high riding patella. Chondrocalcinosis in the menisci are noted. Vascular calcification is present in the popliteal canal.  No acute fracture or dislocation is evident. Advanced arthritic changes. Chondrocalcinosis. No acute fracture, dislocation, or effusion is noted although mild prepatellar soft tissue swelling is seen. Patella is high riding. Xr Hip 2-3 Vw W Pelvis Left    Result Date: 6/21/2019  EXAMINATION: ONE XRAY VIEW OF THE PELVIS AND TWO XRAY VIEWS LEFT HIP; 3 XRAY VIEWS OF THE LEFT WRIST; 3 XRAY VIEWS OF THE LEFT ELBOW 6/21/2019 5:08 am COMPARISON: None. HISTORY: ORDERING SYSTEM PROVIDED HISTORY: trauma TECHNOLOGIST PROVIDED HISTORY: Portable- left hip Reason for exam:->trauma Ordering Physician Provided Reason for Exam: Fall. Pain to left wrist, left hip, left elbow. Acuity: Unknown Type of Exam: Unknown FINDINGS: Pelvis and left hip: Rotation toward the left somewhat limits assessment.  Both femoral heads are well circumscribed and situated within the acetabula. No hip dislocation is evident. Irregularity of the left superior and inferior pubic rami is noted. There appears be a fresh fracture of the left inferior pubic ramus. Overlap of the superior pubic ramus on the left is noted, complicating assessment. SI joints are symmetrical.  Additional views of the pelvis are advised. Degenerative and degenerative disc changes in the lumbar spine are noted. Left wrist: Osteopenia limits assessment. Patient has comminuted impacted intra-articular fracture of the distal radius. A displaced fracture of the distal ulnar shaft is noted with ventral angulation and impaction. Nondisplaced ulnar styloid process fracture is seen. Associated soft tissue swelling is seen. The navicular lunate space is well-preserved. No ulnar minus variance is noted. Left elbow: Osteopenia is present. No acute fracture or dislocation is noted. Soft tissue irregularity over the dorsum of the elbow is noted, consistent with laceration or contusion. No radiopaque foreign body is noted. Pelvis and left hip: Patient is rotated toward the left limiting assessment. Irregularity of the left superior and inferior pubic rami are noted. Inferior pubic rami irregularity appears to be attributable to an acute fracture. Superior pubic rami all fracture shows sclerosis, age indeterminate. Additional views of the pelvis are advised. Please see below. Left wrist: Comminuted intra-articular impacted fracture of the distal radius with soft tissue swelling. Fracture of the distal ulnar shaft with ventral angulation. Nondisplaced ulnar styloid process fracture. Left elbow: Osteopenia. No acute fracture dislocation. Dorsal soft tissue irregularity consistent with contusion or laceration. RECOMMENDATION: Additional films of the pelvis to include inlet and outlet views without rotation are advised.           PROCEDURES   Unless otherwise noted below, none Procedures    CRITICAL CARE TIME   N/A    CONSULTS:  1. Dr. Denys Marquez  2. DR. Cardona  IP CONSULT TO ORTHOPEDIC SURGERY  IP CONSULT TO INTERNAL MEDICINE      EMERGENCY DEPARTMENT COURSE and DIFFERENTIALDIAGNOSIS/MDM:   Vitals:    Vitals:    06/21/19 0549 06/21/19 0651 06/21/19 0736 06/21/19 0912   BP: 102/64 127/61 123/62 (!) 102/45   Pulse:   73 84   Resp:   18 20   Temp:       TempSrc:       SpO2: 95% 96% 97% 93%   Weight:       Height:           Patient was given thefollowing medications:  Medications   0.9 % sodium chloride bolus (1,000 mLs Intravenous New Bag 6/21/19 0654)   morphine injection 4 mg (has no administration in time range)   HYDROcodone-acetaminophen (NORCO) 5-325 MG per tablet 1 tablet (1 tablet Oral Given 6/21/19 0429)   ondansetron (ZOFRAN-ODT) disintegrating tablet 4 mg (4 mg Oral Given 6/21/19 0550)   cyclobenzaprine (FLEXERIL) tablet 5 mg (5 mg Oral Given 6/21/19 0654)   ALPRAZolam [place]) tablet 0.5 mg (0.5 mg Oral Given 6/21/19 0654)       Patient presented after a fall. She has evidence of comminuted left wrist fracture. She is placed in volar splint. Has difficulty moving her legs in bed. CT of her pelvis reveals iliac crest fracture, superior and inferior pubic ramus fracture along with sacrum fracture. She typically uses a walker in assisted living. She is unable to barely even move her legs in bed. Due to this she will be admitted for further work-up and treatment. Discussed this with internal medicine. Laboratory testing is unremarkable. She remains neurologically intact here. No other obvious sign of trauma. Patient was stable at time of admission. FINAL IMPRESSION      1. Fall from bed, initial encounter    2. Closed fracture of left wrist, initial encounter    3. Acute pain of right knee    4. Left elbow pain    5.  Closed fracture of single ramus of left pubis, initial encounter ([facility] [address]    6. Closed fracture of sacrum, unspecified portion of sacrum, initial encounter [facility])          DISPOSITION/PLAN   DISPOSITION Decision To Admit 06/21/2019 06:44:46 AM      PATIENT REFERREDTO:  No follow-up provider specified. DISCHARGE MEDICATIONS:  New Prescriptions    No medications on file       DISCONTINUED MEDICATIONS:  Discontinued Medications    ACETAMINOPHEN (TYLENOL ARTHRITIS PAIN PO)    Take  by mouth.                 (Please note that portions ofthis note were completed with a voice recognition program.  Efforts were made to edit the dictations but occasionally words are mis-transcribed.)    Tabby Hdez PA-C (electronically signed)           Tabby Hdez PA-C  06/21/19 [id number]

## 2019-06-21 NOTE — ED PROVIDER NOTES
I reviewed this EKG but did not otherwise see this patient.     The 12 lead EKG was interpreted by me as follows:  Rate: normal with a rate of 74  Rhythm: sinus  Axis: normal  Intervals: first degree AVB, RBBB  ST segments: no ST elevations or depressions  T waves: no abnormal inversions  Non-specific T wave changes: present  Prior EKG comparison: No prior is currently available for comparison        Giuliana Gomez MD  06/21/19 8415

## 2019-06-21 NOTE — ED NOTES
Bed: 20  Expected date:   Expected time:   Means of arrival: [facility]  Comments:  Madelyn Liao RN  06/21/19 4329

## 2019-06-21 NOTE — PROGRESS NOTES
CLINICAL BEDSIDE SWALLOWING EVALUATION  Speech Therapy Department    Patient Name:  Adolph Goldberg  :  1/15/1921  Pain level:Pt reported pain in buttox  Medical Diagnosis:   Fracture of other parts of pelvis, initial encounter for closed fracture ([address] [S32.89XA]  Fracture of other parts of pelvis, initial encounter for closed fracture [facility]) Christoph Malik    HIP: Per chart: Adolph Goldberg is a 80 y.o. female that presents to the emergency department with a chief complaint of some right knee pain, left hip pain and left wrist pain. Patient has dementia from assisted living at [facility]. Story attending got was that the patient rolled out of bed however her story I am getting from power of  and niece is that she was trying to get up to go to the bathroom and fell. Patient denies headaches. Does not take anticoagulants. Denies any wounds or any other symptoms. Difficult however to obtain history given her history of dementia. Treatment Diagnosis: Mild/Moderate Oropharyngeal Dysphagia    Impressions: Pt was seen sitting upright in bed on O2 via nasal cannula. Pt oriented to self and . Oral mechanism exam revealed some missing dentition. Per Pt and her family members, Pt consumes a regular texture diet with thin liquids at assisted living facility. Various texture trials provided to assess swallowing function. Pt presents with mild/moderate oropharyngeal dysphagia characterized by prolonged mastication, reduced bolus control, suspected premature bolus loss, and delayed swallow initiation. Thin liquids via cup revealed reduced laryngeal elevation upon manual palpation, with no overt clinical s/s of aspiration penetration. Thin liquids via straw revealed suspected premature bolus loss with a significant coughing episode. Regular solids revealed prolonged and anterior focused mastication, reduced bolus cohesion, reduced A-P propulsion, and mild anterior bolus loss.  Soft solids revealed improved mastication and bolus control. At this time,Pt appears to tolerated thin liquids via cup however is at increased risk for aspiration with thin liquids via straw. Recommend downgrade to Dysphagia III (advanced) diet with thin liquids, no straws. Dietary Recommendations:  Dysphagia III (Advanced) with thin liquids, no straw, meds in puree    Strategies: 90 degree positioning with all p.o. intake; small bites/sips; alternate textures through meal; reduce rate of intake    Treatment/Goals: Speech therapy for dysphagia tx 3-5 times per week. ST.) Pt will tolerate recommended diet without s/s of aspiration   2.) If clinical symptoms of penetration/aspiration continue to be noted,Pt will tolerate MBS to r/o aspiration and determine appropriate diet/liquid level. 3.) Pt will tolerate diet advance to least restricted diet, as clinically indicated, with no signs of aspiration     Oral motor Exam:  Dentition: some missing teeth, partial dentures  Labial/Facial: WFL  Lingual: WFL  Voice: WFL    Oral Phase:   Prolonged mastication  Reduced bolus control  Anterior focused mastication  Reduced A-P propulsion  Apparent premature bolus loss to pharynx  Uncleared oral stasis  Anterior bolus loss    Pharyngeal Phase:  Apparent pharyngeal pooling  Delayed swallow initiation   Decreased laryngeal elevation via palpation   Coughing episode with thin liquid via straw    Patient/Family Education:Education, results and recommendations given to the Pt and nurse, who verbalized understanding    Timed Code Treatment: 0 minutes    Total Treatment Time: 25 minutes    Discharge Recommendations: Speech Therapy for Speech/Dysphagia treatment at discharge. Melany Gonzalez   Clinician      The speech-language pathologist was present, directed the patient's care, made skilled judgment and was responsible for assessment and treatment.     Amanda Vincent M.A., [address] [place] [address]  Speech-Language Pathologist

## 2019-06-21 NOTE — PROGRESS NOTES
Repositioned in bed. Shane Muñiz Spoke with family about POC. Leggett in place.  A little uncomfortable but tolerable

## 2019-06-22 ENCOUNTER — APPOINTMENT (OUTPATIENT)
Dept: GENERAL RADIOLOGY | Age: 84
DRG: 536 | End: 2019-06-22
Payer: MEDICARE

## 2019-06-22 PROBLEM — S32.592A CLOSED FRACTURE OF SINGLE RAMUS OF LEFT PUBIS (HCC): Status: ACTIVE | Noted: 2019-06-22

## 2019-06-22 PROBLEM — S32.10XD CLOSED FRACTURE OF SACRUM AND COCCYX WITH ROUTINE HEALING: Status: ACTIVE | Noted: 2019-06-22

## 2019-06-22 PROBLEM — S32.2XXD CLOSED FRACTURE OF SACRUM AND COCCYX WITH ROUTINE HEALING: Status: ACTIVE | Noted: 2019-06-22

## 2019-06-22 PROBLEM — S32.302A CLOSED FRACTURE OF LEFT ILIAC CREST (HCC): Status: ACTIVE | Noted: 2019-06-22

## 2019-06-22 PROBLEM — R52 UNCONTROLLED PAIN: Status: ACTIVE | Noted: 2019-06-22

## 2019-06-22 PROBLEM — R26.2 UNABLE TO AMBULATE: Status: ACTIVE | Noted: 2019-06-22

## 2019-06-22 PROBLEM — S32.502A CLOSED NONDISPLACED FRACTURE OF LEFT PUBIS (HCC): Status: ACTIVE | Noted: 2019-06-22

## 2019-06-22 PROBLEM — S32.10XA SACRAL FRACTURE, CLOSED (HCC): Status: ACTIVE | Noted: 2019-06-22

## 2019-06-22 PROBLEM — S59.202A: Status: ACTIVE | Noted: 2019-06-22

## 2019-06-22 LAB
ANION GAP SERPL CALCULATED.3IONS-SCNC: 8 MMOL/L (ref 3–16)
BUN BLDV-MCNC: 26 MG/DL (ref 7–20)
CALCIUM SERPL-MCNC: 7.9 MG/DL (ref 8.3–10.6)
CHLORIDE BLD-SCNC: 105 MMOL/L (ref 99–110)
CO2: 22 MMOL/L (ref 21–32)
CREAT SERPL-MCNC: 1.1 MG/DL (ref 0.6–1.2)
GFR AFRICAN AMERICAN: 55
GFR NON-AFRICAN AMERICAN: 46
GLUCOSE BLD-MCNC: 123 MG/DL (ref 70–99)
HCT VFR BLD CALC: 29 % (ref 36–48)
HEMOGLOBIN: 9.7 G/DL (ref 12–16)
MCH RBC QN AUTO: 30.3 PG (ref 26–34)
MCHC RBC AUTO-ENTMCNC: 33.6 G/DL (ref 31–36)
MCV RBC AUTO: 90.2 FL (ref 80–100)
PDW BLD-RTO: 15.5 % (ref 12.4–15.4)
PLATELET # BLD: 167 K/UL (ref 135–450)
PMV BLD AUTO: 8.1 FL (ref 5–10.5)
POTASSIUM SERPL-SCNC: 4.9 MMOL/L (ref 3.5–5.1)
RBC # BLD: 3.22 M/UL (ref 4–5.2)
SODIUM BLD-SCNC: 135 MMOL/L (ref 136–145)
WBC # BLD: 5.9 K/UL (ref 4–11)

## 2019-06-22 PROCEDURE — 25600 CLTX DST RDL FX/EPHYS SEP WO: CPT | Performed by: ORTHOPAEDIC SURGERY

## 2019-06-22 PROCEDURE — 6370000000 HC RX 637 (ALT 250 FOR IP): Performed by: INTERNAL MEDICINE

## 2019-06-22 PROCEDURE — 85027 COMPLETE CBC AUTOMATED: CPT

## 2019-06-22 PROCEDURE — 97166 OT EVAL MOD COMPLEX 45 MIN: CPT

## 2019-06-22 PROCEDURE — 99222 1ST HOSP IP/OBS MODERATE 55: CPT | Performed by: ORTHOPAEDIC SURGERY

## 2019-06-22 PROCEDURE — 97530 THERAPEUTIC ACTIVITIES: CPT

## 2019-06-22 PROCEDURE — 1200000000 HC SEMI PRIVATE

## 2019-06-22 PROCEDURE — 6360000002 HC RX W HCPCS: Performed by: INTERNAL MEDICINE

## 2019-06-22 PROCEDURE — 71045 X-RAY EXAM CHEST 1 VIEW: CPT

## 2019-06-22 PROCEDURE — 80048 BASIC METABOLIC PNL TOTAL CA: CPT

## 2019-06-22 PROCEDURE — 27197 CLSD TX PELVIC RING FX: CPT | Performed by: ORTHOPAEDIC SURGERY

## 2019-06-22 PROCEDURE — 36415 COLL VENOUS BLD VENIPUNCTURE: CPT

## 2019-06-22 PROCEDURE — 97162 PT EVAL MOD COMPLEX 30 MIN: CPT

## 2019-06-22 PROCEDURE — 2580000003 HC RX 258: Performed by: INTERNAL MEDICINE

## 2019-06-22 RX ORDER — HYDROCODONE BITARTRATE AND ACETAMINOPHEN 5; 325 MG/1; MG/1
1 TABLET ORAL EVERY 6 HOURS PRN
Status: DISCONTINUED | OUTPATIENT
Start: 2019-06-22 | End: 2019-06-26 | Stop reason: HOSPADM

## 2019-06-22 RX ORDER — LEVOFLOXACIN 5 MG/ML
500 INJECTION, SOLUTION INTRAVENOUS ONCE
Status: COMPLETED | OUTPATIENT
Start: 2019-06-22 | End: 2019-06-22

## 2019-06-22 RX ORDER — FUROSEMIDE 10 MG/ML
20 INJECTION INTRAMUSCULAR; INTRAVENOUS ONCE
Status: COMPLETED | OUTPATIENT
Start: 2019-06-22 | End: 2019-06-22

## 2019-06-22 RX ORDER — IPRATROPIUM BROMIDE AND ALBUTEROL SULFATE 2.5; .5 MG/3ML; MG/3ML
1 SOLUTION RESPIRATORY (INHALATION) EVERY 4 HOURS PRN
Status: DISCONTINUED | OUTPATIENT
Start: 2019-06-22 | End: 2019-06-26 | Stop reason: HOSPADM

## 2019-06-22 RX ORDER — KETOROLAC TROMETHAMINE 15 MG/ML
15 INJECTION, SOLUTION INTRAMUSCULAR; INTRAVENOUS EVERY 6 HOURS PRN
Status: DISCONTINUED | OUTPATIENT
Start: 2019-06-22 | End: 2019-06-26 | Stop reason: HOSPADM

## 2019-06-22 RX ORDER — LEVOFLOXACIN 5 MG/ML
250 INJECTION, SOLUTION INTRAVENOUS
Status: DISCONTINUED | OUTPATIENT
Start: 2019-06-24 | End: 2019-06-22

## 2019-06-22 RX ORDER — HYDROCODONE BITARTRATE AND ACETAMINOPHEN 7.5; 325 MG/1; MG/1
1 TABLET ORAL EVERY 6 HOURS PRN
Status: DISCONTINUED | OUTPATIENT
Start: 2019-06-22 | End: 2019-06-26 | Stop reason: HOSPADM

## 2019-06-22 RX ORDER — LEVOFLOXACIN 5 MG/ML
250 INJECTION, SOLUTION INTRAVENOUS EVERY 24 HOURS
Status: DISCONTINUED | OUTPATIENT
Start: 2019-06-23 | End: 2019-06-26 | Stop reason: HOSPADM

## 2019-06-22 RX ADMIN — LEVOFLOXACIN 500 MG: 5 INJECTION, SOLUTION INTRAVENOUS at 17:17

## 2019-06-22 RX ADMIN — TIMOLOL MALEATE 1 DROP: 5 SOLUTION OPHTHALMIC at 23:23

## 2019-06-22 RX ADMIN — TIMOLOL MALEATE 1 DROP: 5 SOLUTION OPHTHALMIC at 08:23

## 2019-06-22 RX ADMIN — ENOXAPARIN SODIUM 30 MG: 30 INJECTION SUBCUTANEOUS at 08:06

## 2019-06-22 RX ADMIN — BRIMONIDINE TARTRATE 1 DROP: 2 SOLUTION OPHTHALMIC at 08:20

## 2019-06-22 RX ADMIN — HYDROCODONE BITARTRATE AND ACETAMINOPHEN 1 TABLET: 5; 325 TABLET ORAL at 16:48

## 2019-06-22 RX ADMIN — FUROSEMIDE 20 MG: 10 INJECTION, SOLUTION INTRAMUSCULAR; INTRAVENOUS at 17:16

## 2019-06-22 RX ADMIN — HYDROCODONE BITARTRATE AND ACETAMINOPHEN 1 TABLET: 5; 325 TABLET ORAL at 11:39

## 2019-06-22 RX ADMIN — TRAMADOL HYDROCHLORIDE 50 MG: 50 TABLET, FILM COATED ORAL at 04:57

## 2019-06-22 RX ADMIN — SERTRALINE 75 MG: 50 TABLET, FILM COATED ORAL at 08:06

## 2019-06-22 RX ADMIN — ACETAMINOPHEN 650 MG: 325 TABLET, FILM COATED ORAL at 06:12

## 2019-06-22 RX ADMIN — KETOROLAC TROMETHAMINE 15 MG: 15 INJECTION, SOLUTION INTRAMUSCULAR; INTRAVENOUS at 08:24

## 2019-06-22 RX ADMIN — SODIUM CHLORIDE: 9 INJECTION, SOLUTION INTRAVENOUS at 02:19

## 2019-06-22 RX ADMIN — LATANOPROST 1 DROP: 50 SOLUTION OPHTHALMIC at 23:23

## 2019-06-22 RX ADMIN — BRIMONIDINE TARTRATE 1 DROP: 2 SOLUTION OPHTHALMIC at 23:23

## 2019-06-22 ASSESSMENT — PAIN DESCRIPTION - LOCATION: LOCATION: HAND

## 2019-06-22 ASSESSMENT — PAIN SCALES - GENERAL
PAINLEVEL_OUTOF10: 6
PAINLEVEL_OUTOF10: 8
PAINLEVEL_OUTOF10: 8
PAINLEVEL_OUTOF10: 6
PAINLEVEL_OUTOF10: 0
PAINLEVEL_OUTOF10: 7

## 2019-06-22 ASSESSMENT — PAIN SCALES - WONG BAKER: WONGBAKER_NUMERICALRESPONSE: 6

## 2019-06-22 ASSESSMENT — PAIN DESCRIPTION - ORIENTATION: ORIENTATION: LEFT

## 2019-06-22 NOTE — PROGRESS NOTES
Patient Active Problem List   Diagnosis    Senile osteoporosis    Back pain    Hyperparathyroidism ([address]    Fracture of other parts of pelvis, initial encounter for closed fracture [facility] [address]    Displaced physeal fracture of distal end of left radius    Closed fracture of sacrum and coccyx with routine healing    Closed nondisplaced fracture of left pubis (HCC)    Closed fracture of left iliac crest (HCC)    Uncontrolled pain    Unable to ambulate   H&P  Dictated

## 2019-06-22 NOTE — PROGRESS NOTES
Family not wanting to use heavy narcotics. Requesting nurse reach out to  Related to another form of medication. Call placed to Dr. Deleta Rinne. News orders obtained for Toradol. Family notified.

## 2019-06-22 NOTE — PROGRESS NOTES
Spoke with family at length regarding making a plan for pt's pain management. Bob had previously been reluctant to have narcotics given to pt as a family member had hallucinations with narcotics. Pt initially grimacing and repeatedly asking why her left hand hurts, pt has hx of dementia and attempts to reorient to situation have been ineffective. Gave IV toradol with some success. Pt vocalizing less about her left hand, but still grimacing and states she is having pain. Discussed with Fredo Treviño that Toradol would not be a long term medication for pain control. POA states interest in trying a PO pain medication like Norco. Page placed to Dr Albin Saucedo to request. Repositioned in bed for comfort.

## 2019-06-22 NOTE — PROGRESS NOTES
Moved pt in wheeled recliner to room 4479 for greater visibility from nurses station as pt has dementia and family fears she may try to get out of bed without calling out for help. Family aware of transfer. All possessions brought to new room. CCTV camera 14 remains at bedside, chair alarm is armed.

## 2019-06-22 NOTE — PROGRESS NOTES
Yes  Activity Tolerance  Activity Tolerance: Patient limited by pain;Treatment limited secondary to decreased cognition  Activity Tolerance: Pt's O2 remaining above 90 during eval  Safety Devices  Safety Devices in place: Yes  Type of devices: All fall risk precautions in place;Gait belt;Patient at risk for falls;Call light within reach; Left in chair;Chair alarm in place;Nurse notified  Restraints  Initially in place: No           Patient Diagnosis(es): The primary encounter diagnosis was Fall from bed, initial encounter. Diagnoses of Closed fracture of left wrist, initial encounter, Acute pain of right knee, Left elbow pain, Closed fracture of single ramus of left pubis, initial encounter ([facility] [address] Closed fracture of sacrum, unspecified portion of sacrum, initial encounter ([facility] [address] Senile osteoporosis, and Personal history of other drug therapy were also pertinent to this visit. has a past medical history of Allergic rhinitis, Cataract, Dementia, Depression, GERD (gastroesophageal reflux disease), Glaucoma, Kyphoscoliosis, Leg fracture, left, Macular degeneration, and Osteoporosis. has a past surgical history that includes Leg Surgery (1975); Breast lumpectomy; and Knee cartilage surgery (1960's). Treatment Diagnosis: Decreased functional mobility, safety awareness, ADL status, ROM, endurance, high-level IADLs, strength, and fine motor control associated w/ wrist and pelvis fractures. Restrictions  Restrictions/Precautions  Restrictions/Precautions: Fall Risk(high fall risk )  Required Braces or Orthoses?: No  Position Activity Restriction  Other position/activity restrictions: MD recommending platform walker LLE (Simultaneous filing.  User may not have seen previous data.)    Subjective   General  Chart Reviewed: Yes  Family / Caregiver Present: Yes(niece and niece-in-law)  Referring Practitioner: Yoselyn Gutierrez MD  Diagnosis: L Pelvis and wrist fractures  Subjective  Subjective: Pt supine in bed, with Oriented to situation;Oriented to person;Disoriented to time;Disoriented to place(Pt reported she knew she fell out of bed at beginning of session but throughout session exhibited confusion stating she didn't know why her L hand was hurting)  Observation/Palpation  Posture: Poor(severe kyphosis with forward head )  Balance  Sitting Balance: Minimal assistance(Pt leaning to R requiring support for sitting midline. )  Standing Balance: Moderate assistance(x2; first attempt Mod A of 1 with arm support to ensure NWB on LUE. Second attempt Mod A of 1 w/ Paulene Lightning)  Standing Balance  Time: ~2min  Activity: standing by EOB  Functional Mobility  Functional - Mobility Device: No device  Activity: Other(a few steps forward)  Assist Level: Moderate assistance  Functional Mobility Comments: Pt took a few steps forward Mod A with pt stating \"get out of my way and I'll walk\". Tone RUE  RUE Tone: Normotonic  Tone LUE  LUE Tone: Not tested(d/t L wrist fracture)  Coordination  Movements Are Fluid And Coordinated: No  Coordination and Movement description: Decreased accuracy; Right UE(L UE not used for activity)  Quality of Movement Other  Comment: Pt's decreased accuracy for RUE d/t vision and cognition deficits. Bed mobility  Supine to Sit: Dependent/Total(mod x 1 + min x 1 )  Scooting: Dependent/Total  Comment: HOB elevated  Transfers  Sit to stand: Moderate assistance(from EOB; from EOB w/ Paulene Lightning)  Stand to sit: Moderate assistance(to EOB; to recliner w/ Paulene Lightning)  Vision - Basic Assessment  Prior Vision: Wears glasses all the time  Visual History: Macular degeneration  Patient Visual Report: Other (add comment)(Pt stating the walls look like gray blocks with pink centers and that she can see faces up close )  Visual Field Cut: Not tested  Cognition  Overall Cognitive Status: Exceptions  Arousal/Alertness: Inconsistent responses to stimuli  Following Commands:  Follows one step commands with increased time;Follows one step commands with repetition  Attention Span: Difficulty dividing attention  Memory: Decreased recall of biographical Information;Decreased short term memory;Decreased recall of recent events;Decreased recall of precautions  Safety Judgement: Decreased awareness of need for safety;Decreased awareness of need for assistance  Problem Solving: Decreased awareness of errors;Assistance required to generate solutions;Assistance required to identify errors made;Assistance required to implement solutions;Assistance required to correct errors made  Insights: Not aware of deficits  Initiation: Requires cues for all  Sequencing: Requires cues for all  Perception  Overall Perceptual Status: Impaired  Initiation: Cues to initiate tasks     Sensation  Overall Sensation Status: WFL(LUE not tested)        RUE AROM (degrees)  RUE AROM : WFL  Right Hand AROM (degrees)  Right Hand AROM: WFL  Right Hand General AROM: LUE not tested d/t injury and wrapping  LUE Strength  LUE Strength Comment: Not tested d/t wrapping and injury  RUE Strength  Gross RUE Strength: WFL  R Hand General: 3+/5                   Plan   Plan  Times per week: 5-7  Times per day: Daily  Current Treatment Recommendations: Strengthening, Endurance Training, Patient/Caregiver Education & Training, ROM, Self-Care / ADL, Home Management Training, Functional Mobility Training, Safety Education & Training             AM-PAC Score        -[facility] Inpatient Daily Activity Raw Score: 13 (06/22/19 1257)  AM-PAC Inpatient ADL T-Scale Score : 32.03 (06/22/19 1257)  ADL Inpatient CMS 0-100% Score: 63.03 (06/22/19 1257)  ADL Inpatient CMS G-Code Modifier : CL (06/22/19 1257)    Goals  Short term goals  Time Frame for Short term goals: Discharge  Short term goal 1: Mod A for UB dressing/bathing  Short term goal 2: Mod A for LB dressing/bathing  Short term goal 3: Min A for functional mobility for ADLs w/AAD  Short term goal 4: Min A for functional transfers to ADL surfaces w/AAD       Therapy Time   Individual Concurrent Group Co-treatment   Time In 1113         Time Out 1208         Minutes 55           Timed Code Treatment Minutes:   40    Total Treatment Minutes:  [address]  S/OT    C/ [address], OT   I have directly observed this treatment and have read and approve this note.    Nito Joshi, OTR/L, [id number]

## 2019-06-22 NOTE — PROGRESS NOTES
Physical Therapy    Facility/Department: [address] ORTHO/NEURO NURSING  Initial Assessment    NAME: Christos Virgen  : 1/15/1921  MRN: [id number]    Date of Service: 2019    Discharge Recommendations:  Christos Virgen scored a 9/24 on the AM-PAC short mobility form. Current research shows that an AM-PAC score of 17 or less is typically not associated with a discharge to the patient's home setting. Based on the patients AM-PAC score and their current functional mobility deficits, it is recommended that the patient have 3-5 sessions per week of Physical Therapy at d/c to increase the patients independence. PT Equipment Recommendations  Equipment Needed: No     Assessment   Body structures, Functions, Activity limitations: Decreased functional mobility ; Decreased strength;Decreased endurance;Decreased safe awareness;Decreased ROM; Decreased cognition;Decreased balance  Assessment: Pt presents with left wrist fx and multiple pelvic fractures due to fall in ALU apartment, with resulting sig decrease in all functional mobility tasks. To benefit from PT to address deficits and achieve max functional level. Treatment Diagnosis: Decreased strength, mobility, gait   Prognosis: Good  Decision Making: Medium Complexity  History: dementia, wrist fracture, pelvic fractures  Exam: AM PAC  Clinical Presentation: evolving due to pain and cognition   Patient Education: pt educated on safety with all transfer and bed mobility tasks. Barriers to Learning: decreased cognition, recall  REQUIRES PT FOLLOW UP: Yes  Activity Tolerance  Activity Tolerance: Patient Tolerated treatment well  Activity Tolerance: Pt tolerated well, up in chair at end of session with all needs in reach. Patient Diagnosis(es): The primary encounter diagnosis was Fall from bed, initial encounter.  Diagnoses of Closed fracture of left wrist, initial encounter, Acute pain of right knee, Left elbow pain, Closed fracture of single ramus of left pubis, initial encounter [facility]), Closed fracture of sacrum, unspecified portion of sacrum, initial encounter ([address] Senile osteoporosis, and Personal history of other drug therapy were also pertinent to this visit. has a past medical history of Allergic rhinitis, Cataract, Dementia, Depression, GERD (gastroesophageal reflux disease), Glaucoma, Kyphoscoliosis, Leg fracture, left, Macular degeneration, and Osteoporosis. has a past surgical history that includes Leg Surgery (1975); Breast lumpectomy; and Knee cartilage surgery (1960's). Restrictions  Restrictions/Precautions  Restrictions/Precautions: Fall Risk(high fall risk )  Required Braces or Orthoses?: No  Position Activity Restriction  Other position/activity restrictions: MD recommending platform walker LLE (Simultaneous filing. User may not have seen previous data.)  Vision/Hearing  Vision: Impaired(vision nearly absent in right eye, macular degeneration. )  Vision Exceptions: Wears glasses at all times  Hearing: Exceptions to [facility]  Hearing Exceptions: Hard of hearing/hearing concerns; No hearing aid     Subjective  General  Chart Reviewed: Yes  Family / Caregiver Present: Yes(2 nieces )  Referring Practitioner: Rusty Salazar MD  Referral Date : 06/21/19  Diagnosis: pelvic fractures, L wrist fx   Follows Commands: Within Functional Limits  Other (Comment): very hard of hearing, able to follow simple commands once properly hear request   Subjective  Subjective: pt agreeable to treatment, reporting pain in left wrist   Pain Screening  Patient Currently in Pain: Yes  Pain Assessment  Pain Assessment: Faces  Patient's Stated Pain Goal: 6  Vital Signs  Patient Currently in Pain: Yes       Orientation  Orientation  Overall Orientation Status: Impaired  Orientation Level: Oriented to person;Oriented to situation  Social/Functional History  Social/Functional History  Lives With: Alone  Type of Home: Assisted living  Home Layout: Multi-level(lives on first floor of ALU facility, elevator present )  Home Access: Level entry  Bathroom Shower/Tub: Walk-in shower(sponge bathes, does not shower )  Bathroom Toilet: Handicap height  Bathroom Equipment: Grab bars in shower  Bathroom Accessibility: Walker accessible, Accessible  Home Equipment: Rolling walker, Alert Button, Grab bars  Receives Help From: (nurse administers medication )  ADL Assistance: Independent(pt was I with all basic ADLs, toileting and dressing tasks. )  Homemaking Assistance: (staff with all cooking, cleaning and laundry tasks)  Homemaking Responsibilities: No  Ambulation Assistance: Independent  Transfer Assistance: Independent  Active : No  Leisure & Hobbies: Pt participated in some facility activities. Additional Comments: Family reports 3 falls in past year. Pt was I with all basic ADLs, able to amb to dining area for lunch and dinner. Cognition   Oriented x person and situation      Objective     Observation/Palpation  Posture: Poor(severe kyphosis with forward head )    PROM RLE (degrees)  RLE PROM: WFL  PROM LLE (degrees)  LLE PROM: WFL  Strength RLE  Strength RLE: Exception  Comment: grossly 3-/5   Strength LLE  Strength LLE: Exception  Comment: grossly 3-/5   Tone RLE  RLE Tone: Normotonic  Sensation  Overall Sensation Status: WFL  Bed mobility  Supine to Sit: Dependent/Total(mod x 1 + min x 1 )  Transfers  Sit to Stand: Moderate Assistance  Stand to sit: Minimal Assistance  Bed to Chair: (STEADY )  Ambulation  Ambulation?: No  Stairs/Curb  Stairs?: No     Balance  Sitting - Static: Fair  Sitting - Dynamic: Fair  Standing - Static: Poor        Plan   Plan  Times per week: 7  Times per day: Daily  Current Treatment Recommendations: Strengthening, Transfer Training, Balance Training, Gait Training, Functional Mobility Training  Safety Devices  Type of devices:  All fall risk precautions in place, Gait belt, Nurse notified, Call light within reach, Chair alarm in place, Left in

## 2019-06-22 NOTE — PROGRESS NOTES
Shift assessment competed & documented. Pt. A&O x4. Does repeat things at times. Niece at bedside. Full sensation in L wrist (ace wrapped) and bilateral extremities. Denies numbness or tingling. Leggett to gravity. Emptied and charted. Leggett care provided w/ soap & water. On 3L of O2 at this time. Pt. Is a mouth breather. Reviewed evening meds and POC. No questions at this time. Call light and Bedside table within reach. Fall precautions in place, including camera. The care plan and education has been reviewed and mutually agreed upon with the patient.

## 2019-06-22 NOTE — CONSULTS
[address] [facility] Orthopedic Surgery  Consult Note        This patient is seen in consultation at the request of Dr Primo Resendez MD and Jose Guadalupe Huitron PA-C    Reason for Consult:  Left wrist and hip pain/ moderately displaced distal radius fracture and left pubic ramus fracture. CHIEF COMPLAINT:  Left hip and wrist pan/fall    History Obtained From:  patient, family member - niece, electronic medical record    HISTORY OF PRESENT ILLNESS:    Ms. Riley Reynolds is a 80 y.o.  female left handed who presented 6/21/2019 to [facility] ED via EMS for evaluation of a left wrist and hip injury. The patient reports that this injury occurred when she fell. She was first seen and evaluated in  ED, when she was x-rayed and splinted, and admitted to the Hospital, and I was consulted. The patient denies any other injuries. Rates pain a 8/10 VAS moderate, sharp, constant and show no change. Movement makes the pain worse, the splint and resting makes the pain better. Alleviating factors elevation and rest. No numbness or tingling sensation. Past Medical History:        Diagnosis Date    Allergic rhinitis     Cataract     Dementia     Depression     GERD (gastroesophageal reflux disease)     Glaucoma     Kyphoscoliosis     Leg fracture, left     severe    Macular degeneration     Osteoporosis        Past Surgical History:        Procedure Laterality Date    BREAST LUMPECTOMY      KNEE CARTILAGE SURGERY  1960's    LEG SURGERY  1975    severe fracture repair       Medications prior to admission:   Prior to Admission medications    Medication Sig Start Date End Date Taking?  Authorizing Provider   brimonidine-timolol (COMBIGAN) 0.2-0.5 % ophthalmic solution Place 1 drop into the right eye every 12 hours    Yes Historical Provider, MD   hydrocortisone 2.5 % cream Apply topically 2 times daily To hemorrhoid as needed   Yes Historical Provider, MD   acetaminophen (TYLENOL) 650 MG extended release tablet Take 650 mg by mouth every 6 hours as needed for Pain   Yes Historical Provider, MD   QUEtiapine (SEROQUEL) 25 MG tablet Take 1 tablet by mouth nightly 3/23/18  Yes Dc Walter MD   sertraline (ZOLOFT) 50 MG tablet Take 1.5 tablets by mouth daily 3/20/18  Yes Dc Walter MD   loratadine (CLARITIN) 10 MG tablet Take 10 mg by mouth daily.      Yes Historical Provider, MD   calcium carbonate (TUMS) 500 MG chewable tablet Take 1 tablet by mouth 3 times daily as needed    Yes Historical Provider, MD       Current Medications:   Current Facility-Administered Medications: ketorolac (TORADOL) injection 15 mg, 15 mg, Intravenous, Q6H PRN  HYDROcodone-acetaminophen (NORCO) 5-325 MG per tablet 1 tablet, 1 tablet, Oral, Q6H PRN  HYDROcodone-acetaminophen (NORCO) 7.5-325 MG per tablet 1 tablet, 1 tablet, Oral, Q6H PRN  morphine injection 4 mg, 4 mg, Intravenous, Q30 Min PRN  acetaminophen (TYLENOL) tablet 650 mg, 650 mg, Oral, Q6H PRN  latanoprost (XALATAN) 0.005 % ophthalmic solution 1 drop, 1 drop, Both Eyes, Nightly  calcium carbonate (TUMS) chewable tablet 500 mg, 1 tablet, Oral, TID PRN  hydrocortisone 2.5 % cream, , Topical, BID  QUEtiapine (SEROQUEL) tablet 25 mg, 25 mg, Oral, Nightly  sertraline (ZOLOFT) tablet 75 mg, 75 mg, Oral, Daily  0.9 % sodium chloride infusion, , Intravenous, Continuous  HYDROmorphone HCl PF (DILAUDID) injection 0.5 mg, 0.5 mg, Intravenous, Q4H PRN  ondansetron (ZOFRAN) injection 4 mg, 4 mg, Intravenous, Q6H PRN  enoxaparin (LOVENOX) injection 30 mg, 30 mg, Subcutaneous, Daily  brimonidine (ALPHAGAN) 0.2 % ophthalmic solution 1 drop, 1 drop, Right Eye, BID **AND** timolol (TIMOPTIC) 0.5 % ophthalmic solution 1 drop, 1 drop, Right Eye, BID  traMADol (ULTRAM) tablet 50 mg, 50 mg, Oral, Q6H PRN    Allergies:  Cefzil [cefprozil] and Pcn [penicillins]    Social History     Socioeconomic History    Marital status: Single     Spouse name: Not on file    Number of children: Not on file    Years of education: Not on file    Highest education level: Not on file   Occupational History    Not on file   Social Needs    Financial resource strain: Not on file    Food insecurity:     Worry: Not on file     Inability: Not on file    Transportation needs:     Medical: Not on file     Non-medical: Not on file   Tobacco Use    Smoking status: Never Smoker    Smokeless tobacco: Never Used   Substance and Sexual Activity    Alcohol use: No    Drug use: No    Sexual activity: Never   Lifestyle    Physical activity:     Days per week: Not on file     Minutes per session: Not on file    Stress: Not on file   Relationships    Social connections:     Talks on phone: Not on file     Gets together: Not on file     Attends Muslim service: Not on file     Active member of club or organization: Not on file     Attends meetings of clubs or organizations: Not on file     Relationship status: Not on file    Intimate partner violence:     Fear of current or ex partner: Not on file     Emotionally abused: Not on file     Physically abused: Not on file     Forced sexual activity: Not on file   Other Topics Concern    Not on file   Social History Narrative    Not on file       Family History:  Family History   Problem Relation Age of Onset    Cancer Father         pancreatic    Alcohol Abuse Sister     Depression Sister     Hypertension Sister     Heart Disease Sister        REVIEW OF SYSTEMS:   CONSTITUTIONAL: Denies unexplained weight loss, fevers, chills or fatigue  NEUROLOGICAL: Denies unsteady gait or progressive weakness    PSYCHOLOGICAL: Denies anxiety, depression   SKIN: Denies skin changes, delayed healing, rash, itching   HEMATOLOGIC: Denies easy bleeding or bruising  ENDOCRINE: Denies excessive thirst, urination, heat/cold  RESPIRATORY: Denies current dyspnea, cough  CARDIOVASCULAR: Negative for chest pain at this time. EYES: Negative for photophobia and visual disturbance.    ENT:  Negative for rhinorrhea, epistaxis, sore throat, or hearing loss. GI: Denies nausea, vomiting, diarrhea   : Denies bowel or bladder issues   MUSCULOSKELETAL: left wrist and hp pain  All other ROS reviewed in chart or with patient or family and are grossly negative. PHYSICAL EXAMINATION:  Ms. Herbert Gonzáles is a very pleasant 80 y.o. female who seen today in no acute distress, awake, alert, and oriented. She is well nourished and groomed. Patient with normal affect. Body mass index is 22.51 kg/m². . Skin warm and dry. Resting respiratory rate is 16. Resp deep and easy. Pulse is with regular rate and rhythm    BP (!) 92/52   Pulse 60   Temp 97.5 °F (36.4 °C) (Oral)   Resp 14   Ht 4' 9\" (1.448 m)   Wt 104 lb (47.2 kg)   SpO2 (!) 89%   BMI 22.51 kg/m²        Airway is intact  Chest: chest clear, no wheezing, rales, normal symmetric air entry, no tachypnea, retractions or cyanosis  Heart: regular rate and rhythm ; heart sounds normal   Hearing intact, pupil equal and reactive bilateral  Lymphatics; No groin or axillary enlarged lymph nodes. Neck; No swelling  Abdomen; soft, non distended. MUSCULOSKELETAL:   On evaluation of her left upper extremity, there is moderate deformity. There is moderate swelling and moderate ecchymosis. She is tender to palpation over the distal radius, and otherwise nontender over the remainder of the extremity. Range of motion is decreased secondary to pain over the left wrist, but no mechanical block. The skin overlying the left wrist is intact without evidence of lesion, laceration or abrasion. Distal pulses are 2+ and symmetric bilaterally. Sensation is grossly intact to light touch and symmetric bilaterally. Examination of both lower extrimiteis showing a decreased range of motion of the left hip compare to the other side because of pain. There is no swelling that can be seen, or ecchymosis over the left hip. She  has intact sensation and good pedal pulses.   She has significant tenderness on deep palpation over the left pubic ramus. NEUROLOGIC:   Sensory:    Touch:                     Right Upper Extremity:  normal                   Left Upper Extremity:  normal                  Right Lower Extremity:  normal                  Left Lower Extremity:  normal        DATA:    CBC:   Lab Results   Component Value Date    WBC 5.9 06/22/2019    RBC 3.22 06/22/2019    HGB 9.7 06/22/2019    HCT 29.0 06/22/2019    MCV 90.2 06/22/2019    MCH 30.3 06/22/2019    MCHC 33.6 06/22/2019    RDW 15.5 06/22/2019     06/22/2019    MPV 8.1 06/22/2019     WBC:    Lab Results   Component Value Date    WBC 5.9 06/22/2019     PT/INR:  No results found for: PROTIME, INR  PTT:  No results found for: APTT[APTT    IMAGING: Xrays dated 6/21/2019, 3 views of left wrist were reviewed, and showed moderately displaced distal radius intraarticular fracture. CT scan and Xray's were reviewed, dated 6/21/2019,  AP pelvis and 2 views of the left hip, and showed a minimally displaced pubic ramus and sacral fracture. IMPRESSION: Left wrist and hip pain/ moderately displaced distal radius fracture and left pubic ramus fracture. PLAN:   -  I discussed with Meghan Emery and her family the treatment options and that the overall alignment of the pelvis fractures are good and we can try to treat this non-operatively with WBAT. We discussed the risk of nonunion and or malunion.    - Regarding her left wrist as she is LHD, and she will need her wrist for ambulation given the pelvis fracture, I believe she will benefit from surgical fixation. I discussed with them the overall alignment of the left distal radius fracture and treatment options including both surgical and non-surgical treatment, and that my recommendation is an open reduction and internal fixation given the amount of displacement and comminution of the fracture.    - At this point the family would like to try non-surgical treatment, and we can treat with a brace left wrist that was applied today. We discussed the risk of nonunion and or malunion. Thank you very much for the kind consultation and allowing me to participate in this patient's care. I will continue to keep you apprised of her progress.         Mathieu Blackmon MD   6/22/2019  2:42 PM

## 2019-06-22 NOTE — PROGRESS NOTES
Pt noted to have o2 sat of 85-90% on 3.5 liters. Increased initially to 4 liters, no notable improvement. Page placed to Dr Oumar Watkins for orders. Contacted RT who instructed to increase 02 to 6 liters and they are en route to evaluate pt. Discussed with family at bedside. Pt awake and alert.

## 2019-06-22 NOTE — PROGRESS NOTES
Pt resting awake in bed on a low air loss specialty mattress. Splint and ace wrap noted to left hand and wrist. Pt frequently voicing complaint of pain in left hand and pelvis. Discussed pain medication options with family, POA reported to be reluctant to giving narcotics overnight. Will trial Toradol. Pt o2 sat is 90% on 3.5 liters. Provided IS and attempted to teach with little success. Leggett in place draining hazy yellow urine. Assessment completed at this time.

## 2019-06-23 LAB
ANION GAP SERPL CALCULATED.3IONS-SCNC: 13 MMOL/L (ref 3–16)
BUN BLDV-MCNC: 41 MG/DL (ref 7–20)
CALCIUM SERPL-MCNC: 8.4 MG/DL (ref 8.3–10.6)
CHLORIDE BLD-SCNC: 104 MMOL/L (ref 99–110)
CO2: 20 MMOL/L (ref 21–32)
CREAT SERPL-MCNC: 1.6 MG/DL (ref 0.6–1.2)
GFR AFRICAN AMERICAN: 36
GFR NON-AFRICAN AMERICAN: 30
GLUCOSE BLD-MCNC: 106 MG/DL (ref 70–99)
HCT VFR BLD CALC: 29.6 % (ref 36–48)
HEMOGLOBIN: 9.7 G/DL (ref 12–16)
MCH RBC QN AUTO: 29.8 PG (ref 26–34)
MCHC RBC AUTO-ENTMCNC: 32.7 G/DL (ref 31–36)
MCV RBC AUTO: 91 FL (ref 80–100)
PDW BLD-RTO: 15.7 % (ref 12.4–15.4)
PLATELET # BLD: 148 K/UL (ref 135–450)
PMV BLD AUTO: 8 FL (ref 5–10.5)
POTASSIUM SERPL-SCNC: 4.9 MMOL/L (ref 3.5–5.1)
RBC # BLD: 3.25 M/UL (ref 4–5.2)
SODIUM BLD-SCNC: 137 MMOL/L (ref 136–145)
WBC # BLD: 6.2 K/UL (ref 4–11)

## 2019-06-23 PROCEDURE — 97530 THERAPEUTIC ACTIVITIES: CPT

## 2019-06-23 PROCEDURE — 1200000000 HC SEMI PRIVATE

## 2019-06-23 PROCEDURE — 36415 COLL VENOUS BLD VENIPUNCTURE: CPT

## 2019-06-23 PROCEDURE — 6360000002 HC RX W HCPCS: Performed by: INTERNAL MEDICINE

## 2019-06-23 PROCEDURE — 85027 COMPLETE CBC AUTOMATED: CPT

## 2019-06-23 PROCEDURE — 80048 BASIC METABOLIC PNL TOTAL CA: CPT

## 2019-06-23 PROCEDURE — 6370000000 HC RX 637 (ALT 250 FOR IP): Performed by: INTERNAL MEDICINE

## 2019-06-23 RX ORDER — LORAZEPAM 0.5 MG/1
0.5 TABLET ORAL NIGHTLY
Status: DISCONTINUED | OUTPATIENT
Start: 2019-06-23 | End: 2019-06-24

## 2019-06-23 RX ADMIN — HYDROCODONE BITARTRATE AND ACETAMINOPHEN 1 TABLET: 5; 325 TABLET ORAL at 02:42

## 2019-06-23 RX ADMIN — QUETIAPINE FUMARATE 25 MG: 25 TABLET ORAL at 21:41

## 2019-06-23 RX ADMIN — BRIMONIDINE TARTRATE 1 DROP: 2 SOLUTION OPHTHALMIC at 21:44

## 2019-06-23 RX ADMIN — KETOROLAC TROMETHAMINE 15 MG: 15 INJECTION, SOLUTION INTRAMUSCULAR; INTRAVENOUS at 06:31

## 2019-06-23 RX ADMIN — BRIMONIDINE TARTRATE 1 DROP: 2 SOLUTION OPHTHALMIC at 08:24

## 2019-06-23 RX ADMIN — SERTRALINE 75 MG: 50 TABLET, FILM COATED ORAL at 08:23

## 2019-06-23 RX ADMIN — LORAZEPAM 0.5 MG: 0.5 TABLET ORAL at 21:42

## 2019-06-23 RX ADMIN — LEVOFLOXACIN 250 MG: 5 INJECTION, SOLUTION INTRAVENOUS at 18:56

## 2019-06-23 RX ADMIN — LATANOPROST 1 DROP: 50 SOLUTION OPHTHALMIC at 21:45

## 2019-06-23 RX ADMIN — TIMOLOL MALEATE 1 DROP: 5 SOLUTION OPHTHALMIC at 08:24

## 2019-06-23 RX ADMIN — HYDROCODONE BITARTRATE AND ACETAMINOPHEN 1 TABLET: 5; 325 TABLET ORAL at 14:07

## 2019-06-23 RX ADMIN — TIMOLOL MALEATE 1 DROP: 5 SOLUTION OPHTHALMIC at 21:46

## 2019-06-23 RX ADMIN — ENOXAPARIN SODIUM 30 MG: 30 INJECTION SUBCUTANEOUS at 08:23

## 2019-06-23 ASSESSMENT — PAIN SCALES - WONG BAKER: WONGBAKER_NUMERICALRESPONSE: 6

## 2019-06-23 ASSESSMENT — PAIN DESCRIPTION - LOCATION
LOCATION: BUTTOCKS
LOCATION: WRIST;BUTTOCKS

## 2019-06-23 ASSESSMENT — PAIN SCALES - GENERAL
PAINLEVEL_OUTOF10: 6
PAINLEVEL_OUTOF10: 6
PAINLEVEL_OUTOF10: 0
PAINLEVEL_OUTOF10: 6
PAINLEVEL_OUTOF10: 6
PAINLEVEL_OUTOF10: 0
PAINLEVEL_OUTOF10: 0

## 2019-06-23 ASSESSMENT — PAIN DESCRIPTION - FREQUENCY
FREQUENCY: CONTINUOUS
FREQUENCY: CONTINUOUS

## 2019-06-23 ASSESSMENT — PAIN DESCRIPTION - DESCRIPTORS
DESCRIPTORS: ACHING;SHOOTING;SHARP
DESCRIPTORS: SHARP;SHOOTING

## 2019-06-23 ASSESSMENT — PAIN DESCRIPTION - ONSET: ONSET: SUDDEN

## 2019-06-23 ASSESSMENT — PAIN DESCRIPTION - PAIN TYPE
TYPE: ACUTE PAIN
TYPE: ACUTE PAIN

## 2019-06-23 ASSESSMENT — PAIN DESCRIPTION - ORIENTATION: ORIENTATION: LEFT

## 2019-06-23 NOTE — PROGRESS NOTES
Shift assessment complete am medications given pt confused family at bedside, The care plan and education has been reviewed and mutually agreed upon with family.  Call light within reach

## 2019-06-23 NOTE — PROGRESS NOTES
Occupational Therapy  Facility/Department: [address] ORTHO/NEURO NURSING  Daily Treatment Note  NAME: Ian Grace  : 1/15/1921  MRN: [id number]    Date of Service: 2019    Discharge Recommendations:Silvia Shi scored a 13/24 on the AM-PAC ADL Inpatient form. Current research shows that an AM-PAC score of 17 or less is typically not associated with a discharge to the patient's home setting. Based on the patients AM-PAC score and their current ADL deficits, it is recommended that the patient have 3-5 sessions per week of Occupational Therapy at d/c to increase the patients independence. Assessment   Performance deficits / Impairments: Decreased functional mobility ; Decreased safe awareness;Decreased ADL status; Decreased ROM; Decreased endurance;Decreased strength;Decreased fine motor control;Decreased high-level IADLs  Assessment: Pt is not at her baseline of occupational function as evidenced by the above deficits associated w/ L wrist and pelvis fractures. Pt would benefit from continued skilled acute OT services to address these deficits. Treatment Diagnosis: Decreased functional mobility, safety awareness, ADL status, ROM, endurance, high-level IADLs, strength, and fine motor control associated w/ wrist and pelvis fractures. Prognosis: Fair  Patient Education: OT eval, POC, d/c recommendations   REQUIRES OT FOLLOW UP: Yes  Activity Tolerance  Activity Tolerance: Patient limited by pain;Treatment limited secondary to decreased cognition  Safety Devices  Safety Devices in place: Yes  Type of devices: All fall risk precautions in place;Gait belt;Patient at risk for falls;Call light within reach; Left in chair;Chair alarm in place;Nurse notified         Patient Diagnosis(es): The primary encounter diagnosis was Fall from bed, initial encounter.  Diagnoses of Closed fracture of left wrist, initial encounter, Acute pain of right knee, Left elbow pain, Closed fracture of single ramus of left Dependent/Total  Toileting: None(catheter present, no expressed need for BM)        Balance  Sitting Balance: Minimal assistance(mattress deflated for safety)  Standing Balance: Dependent/Total(Macey of 2 stand step transfer bed>recliner (Verbal and tactile cues to maintain NWB through L hand)  Standing Balance  Time: ~2 minutes x 2  Activity: stand step transfer bed>recliner x 1, static stance at recliner, attempts to take steps forward, limited by pain (Macey of 2) (unable to use platform walker 2/2 patient's height)  Bed mobility  Supine to Sit: Minimal assistance; Moderate assistance(min to modA of 1)  Scooting: Moderate assistance                          Cognition  Overall Cognitive Status: Exceptions  Arousal/Alertness: Inconsistent responses to stimuli  Following Commands: Follows one step commands with increased time; Follows one step commands with repetition  Attention Span: Difficulty dividing attention  Memory: Decreased recall of biographical Information;Decreased short term memory;Decreased recall of recent events;Decreased recall of precautions  Safety Judgement: Decreased awareness of need for safety;Decreased awareness of need for assistance  Problem Solving: Decreased awareness of errors;Assistance required to generate solutions;Assistance required to identify errors made;Assistance required to implement solutions;Assistance required to correct errors made  Insights: Not aware of deficits  Initiation: Requires cues for all  Sequencing: Requires cues for all                                         Plan   Plan  Times per week: 5-7  Times per day: Daily  Current Treatment Recommendations: Strengthening, Endurance Training, Patient/Caregiver Education & Training, ROM, Self-Care / ADL, Home Management Training, Functional Mobility Training, Safety Education & Training    AM-PAC Score        [facility] Daily Activity Raw Score: 13 (06/23/19 1419)  AM-PAC Inpatient ADL T-Scale Score : 32.03 (06/23/19

## 2019-06-23 NOTE — PLAN OF CARE
Problem: Falls - Risk of:  Goal: Will remain free from falls  Description  Will remain free from falls  Outcome: Ongoing  Goal: Absence of physical injury  Description  Absence of physical injury  Outcome: Ongoing     Problem: Risk for Impaired Skin Integrity  Goal: Tissue integrity - skin and mucous membranes  Description  Structural intactness and normal physiological function of skin and  mucous membranes.   Outcome: Ongoing     Problem: Musculor/Skeletal Functional Status  Goal: Highest potential functional level  Outcome: Ongoing  Goal: Absence of falls  Outcome: Ongoing     Problem: Pain:  Goal: Pain level will decrease  Description  Pain level will decrease  Outcome: Ongoing  Goal: Control of acute pain  Description  Control of acute pain  Outcome: Ongoing  Goal: Control of chronic pain  Description  Control of chronic pain  Outcome: Ongoing

## 2019-06-23 NOTE — H&P
[facility] [address]                     [address] [facility], [address]                              HISTORY AND PHYSICAL    PATIENT NAME: Loretta Swift                       :        01/15/1921  MED REC NO:   [id number]                          ROOM:       4232  ACCOUNT NO:   [de-identified]                           ADMIT DATE: 2019  PROVIDER:     Ingrid Burris MD    HISTORY OF PRESENT ILLNESS:  The patient is a 25-year-old white lady  came to the emergency room following an accidental fall. The patient  usually walks with a walker at [facility]. The fall was  accidental without any loss of consciousness. There were no  convulsions. The patient denied any dizziness, although she is a poor  historian and does have some moderate cognitive deficits and dementia. PAST MEDICAL HISTORY:  Pertinent for hyperparathyroidism, depression,  senile osteoporosis, general weakness, macular degeneration,  kyphoscoliosis, glaucoma, gastroesophageal reflux disease, cataract, and  allergic rhinitis. PAST SURGICAL HISTORY:  Pertinent for a leg surgery with a fracture  repair some 40 years ago, breast lumpectomy, and history of knee  cartilage surgery. FAMILY HISTORY:  Both her parents are  because of natural  causes. Father did have some kind of cancer. She had one sister who  was severe alcoholic, depression, hypertension, and had heart disease. SOCIAL HISTORY:  The patient is a single woman, never , never had  any children. She has one niece who is in the picture and her next of  kin. Actually _____ by niece. She was always a nonsmoker, always a  nondrinker all her life. She worked as an  for [employer]. There is no history of substance abuse. CURRENT MEDICATIONS:  Tylenol, Alphagan, Combigan eye drops, calcium  carbonate, hydrocortisone cream, loratadine, quetiapine, ceftaroline,  and Reclast annual infusion.     ALLERGIES:  The patient is allergic to CEFZIL and PENICILLIN. REVIEW OF SYSTEMS:  Pertinent for moderate confusion, significant vision  impairment, also somewhat hard of hearing, no loss of consciousness, no  TIA, no seizure activity. Does have unsteadiness, presently unable to  ambulate, does have uncontrolled pain, at this time complains of pain in  the left wrist and also pelvic region. No genitourinary complaint  except incontinence. No abdominal pain. No hematemesis. No melena. PHYSICAL EXAMINATION:  GENERAL:  She is alert, awake, and oriented x1. Moderately distressed  27-year-old white woman looking actually somewhat younger than her  stated age. VITAL SIGNS:  Her temperature is 98.6, blood pressure 106/61,  respirations 14, heart rate 66. HEENT:  Oral mucosa dry. SKIN:  Warm and dry. NECK:  Supple. Faint carotid bruit. No jugular venous distention. No  lymphadenopathy. LUNGS:  Vesicular breath sounds. Poor inspiration. No crackles or  wheezing. HEART:  Regular rate and rhythm. S1, S2, 1/6 systolic ejection murmur. No gallop rhythm. ABDOMEN:  Soft. EXTREMITIES:  There is tenderness in the pelvic girdle area. There is  also tenderness in the lower lumbar area. There is restricted range of  motion. The patient is also having tenderness in the left wrist with  restricted range of motion on the wrist area. NEUROLOGIC:  There is no distal neurovascular deficit. Babinski is  bilaterally absent. LABORATORY DATA:  Lab evaluation shows sodium 136, potassium 4.8,  chloride 104, CO2 21, BUN 31, creatinine 1.2, anion gap 11. Blood sugar  is 157  GFR is 50. Troponin less than 0.01. Albumin 3.9. Globulin  3.0.  alkaline phosphatase 67. PTH is 81.4, but that was some three  years ago. White blood cell count is 8.7, hemoglobin and hematocrit is  11.3 and 34.6, platelet count is 880. Urinalysis does not appear  infected.   CT scan of the pelvis shows fracture of the left iliac crest,  fracture of the left superior and inferior pubic ramus, and vertical  fracture through the sacrum. X-rays of the left wrist also shows lower  end of comminuted fracture with some displacement at the lower end of  the left radius. ASSESSMENT:  Fracture of sacrum, fracture of pelvis, pubic and iliac  crest, dehydration, fracture of the wrist, general weakness,  uncontrolled pain, unable to ambulate, and history of  hyperparathyroidism. PLAN:  Get her admitted, treat her with IV hydration. PT/OT eval.   Parenteral pain relief. We will need social service for skilled nursing  facility placement. Jen Baeza MD    D: 06/22/2019 11:07:23       T: 06/23/2019 1:25:15     [id number]  Job#: [id number]     Doc#: [id number]    CC:   [facility]

## 2019-06-23 NOTE — PROGRESS NOTES
Physical Therapy  Facility/Department: [address] ORTHO/NEURO NURSING  Daily Treatment Note  NAME: Diane Carrillo  : 1/15/1921  MRN: [id number]    Date of Service: 2019    Discharge Recommendations:  Diane Carrillo scored a  on the AM-PAC short mobility form. Current research shows that an AM-PAC score of 17 or less is typically not associated with a discharge to the patient's home setting. Based on the patients AM-PAC score and their current functional mobility deficits, it is recommended that the patient have 3-5 sessions per week of Physical Therapy at d/c to increase the patients independence. PT Equipment Recommendations  Equipment Needed: No    Assessment   Body structures, Functions, Activity limitations: Decreased functional mobility ; Decreased strength;Decreased endurance;Decreased safe awareness;Decreased ROM; Decreased cognition;Decreased balance  Assessment: Patient needed decreased assistance with all tasks this date, but continues to need 2-person assist for safety and to ensure NWBing of L wrist. Continue to recommend skilled PT to address above deficits and facilitate return to baseline function  Treatment Diagnosis: Decreased strength, mobility, gait   Patient Education: pt educated on safety with all transfer and bed mobility tasks. Barriers to Learning: cognitive  REQUIRES PT FOLLOW UP: Yes  Activity Tolerance  Activity Tolerance: Patient limited by pain     Patient Diagnosis(es): The primary encounter diagnosis was Fall from bed, initial encounter. Diagnoses of Closed fracture of left wrist, initial encounter, Acute pain of right knee, Left elbow pain, Closed fracture of single ramus of left pubis, initial encounter ([address] Closed fracture of sacrum, unspecified portion of sacrum, initial encounter ([address] Senile osteoporosis, and Personal history of other drug therapy were also pertinent to this visit.      has a past medical history of Allergic rhinitis, Cataract, Dementia, Depression, GERD (gastroesophageal reflux disease), Glaucoma, Kyphoscoliosis, Leg fracture, left, Macular degeneration, and Osteoporosis. has a past surgical history that includes Leg Surgery (1975); Breast lumpectomy; and Knee cartilage surgery (1960's). Restrictions  Restrictions/Precautions  Restrictions/Precautions: Weight Bearing, Fall Risk(high fall risk)  Required Braces or Orthoses?: No  Lower Extremity Weight Bearing Restrictions  Left Lower Extremity Weight Bearing: Weight Bearing As Tolerated  Upper Extremity Weight Bearing Restrictions  Left Upper Extremity Weight Bearing: Platform(WB through elbow)  Position Activity Restriction  Other position/activity restrictions: MD recommending platform walker LLE - unable to use platform walker due to patients height. Too tall at lowest setting. Subjective   General  Chart Reviewed: Yes  Family / Caregiver Present: Yes(neice)  Subjective  Subjective: Patient reports seeing things moving. Reported L \"buttock\" pain with mobility. Agreeable to therapy. General Comment  Comments: supine in bed upon arrival with bed alarm on. Patient noted to be confused and having hallucinations          Orientation  Orientation  Orientation Level: Oriented to person  Cognition      Objective   Bed mobility  Supine to Sit: Moderate assistance  Scooting:  Moderate assistance  Transfers  Sit to Stand: 2 Person Assistance(min A of 2 from bed and chair x 2, assist of 1 to maintain NWBing status of L wrist)  Stand to sit: Minimal Assistance  Bed to Chair: 2 Person Assistance(min A of 2 stand pivot transfer)  Ambulation 1  Comments: attempted ambulation with HHA (unable to use platform walker as it is too tall for patient at it's shortest setting) - patient able to take 1 step prior to stopping due to increased pain with WBing on LLE     Balance  Sitting - Static: Good  Sitting - Dynamic: Fair  Standing - Static: Fair(CGA for static standing 2-3 minutes x 2) AM-PAC Score  AM-PAC Inpatient Mobility Raw Score : 11 (06/23/19 1529)  AM-PAC Inpatient T-Scale Score : 33.86 (06/23/19 1529)  Mobility Inpatient CMS 0-100% Score: 72.57 (06/23/19 1529)  Mobility Inpatient CMS G-Code Modifier : CL (06/23/19 1529)          Goals  Short term goals  Time Frame for Short term goals: To be met prior to discharge  Short term goal 1: Supine to sit with mod x 1 - MET 6/2. Short term goal 2: Sit to stand with min x 1   Short term goal 3: Pt to ambulate 5 steps AD ( platform walker) or HHA and mod x 1   Short term goal 4: Pt to SPT with mod x 1   Short term goal 5: Bed mobility with min A - updated goal  Long term goals  Time Frame for Long term goals : STGs = LTGs   Patient Goals   Patient goals : Pt did not state goals     Plan    Plan  Times per week: 7  Times per day: Daily  Current Treatment Recommendations: Strengthening, Transfer Training, Balance Training, Gait Training, Functional Mobility Training, Safety Education & Training  Safety Devices  Type of devices:  All fall risk precautions in place, Gait belt, Nurse notified, Call light within reach, Chair alarm in place, Left in chair, Telesitter in use  Restraints  Initially in place: No     Therapy Time   Individual Concurrent Group Co-treatment   Time In 1335         Time Out 1408         Minutes 33         Timed Code Treatment Minutes: [address], PT     Thanks, Missy Polanco, PT, DPT [id number]

## 2019-06-24 LAB
ANION GAP SERPL CALCULATED.3IONS-SCNC: 12 MMOL/L (ref 3–16)
BUN BLDV-MCNC: 38 MG/DL (ref 7–20)
CALCIUM SERPL-MCNC: 8.6 MG/DL (ref 8.3–10.6)
CHLORIDE BLD-SCNC: 105 MMOL/L (ref 99–110)
CO2: 21 MMOL/L (ref 21–32)
CREAT SERPL-MCNC: 1.2 MG/DL (ref 0.6–1.2)
GFR AFRICAN AMERICAN: 50
GFR NON-AFRICAN AMERICAN: 41
GLUCOSE BLD-MCNC: 143 MG/DL (ref 70–99)
HCT VFR BLD CALC: 29.1 % (ref 36–48)
HEMOGLOBIN: 9.5 G/DL (ref 12–16)
MCH RBC QN AUTO: 29.2 PG (ref 26–34)
MCHC RBC AUTO-ENTMCNC: 32.7 G/DL (ref 31–36)
MCV RBC AUTO: 89.3 FL (ref 80–100)
PDW BLD-RTO: 15.9 % (ref 12.4–15.4)
PLATELET # BLD: 183 K/UL (ref 135–450)
PMV BLD AUTO: 7.7 FL (ref 5–10.5)
POTASSIUM SERPL-SCNC: 4.7 MMOL/L (ref 3.5–5.1)
RBC # BLD: 3.26 M/UL (ref 4–5.2)
SODIUM BLD-SCNC: 138 MMOL/L (ref 136–145)
WBC # BLD: 6.3 K/UL (ref 4–11)

## 2019-06-24 PROCEDURE — 6360000002 HC RX W HCPCS: Performed by: INTERNAL MEDICINE

## 2019-06-24 PROCEDURE — 1200000000 HC SEMI PRIVATE

## 2019-06-24 PROCEDURE — 85027 COMPLETE CBC AUTOMATED: CPT

## 2019-06-24 PROCEDURE — 80048 BASIC METABOLIC PNL TOTAL CA: CPT

## 2019-06-24 PROCEDURE — 97530 THERAPEUTIC ACTIVITIES: CPT

## 2019-06-24 PROCEDURE — 2580000003 HC RX 258: Performed by: INTERNAL MEDICINE

## 2019-06-24 PROCEDURE — 6370000000 HC RX 637 (ALT 250 FOR IP): Performed by: INTERNAL MEDICINE

## 2019-06-24 PROCEDURE — 36415 COLL VENOUS BLD VENIPUNCTURE: CPT

## 2019-06-24 RX ORDER — LORAZEPAM 1 MG/1
1 TABLET ORAL NIGHTLY
Status: DISCONTINUED | OUTPATIENT
Start: 2019-06-24 | End: 2019-06-26 | Stop reason: HOSPADM

## 2019-06-24 RX ORDER — HALOPERIDOL 5 MG/ML
2 INJECTION INTRAMUSCULAR EVERY 4 HOURS PRN
Status: DISCONTINUED | OUTPATIENT
Start: 2019-06-24 | End: 2019-06-26 | Stop reason: HOSPADM

## 2019-06-24 RX ORDER — 0.9 % SODIUM CHLORIDE 0.9 %
500 INTRAVENOUS SOLUTION INTRAVENOUS ONCE
Status: COMPLETED | OUTPATIENT
Start: 2019-06-24 | End: 2019-06-24

## 2019-06-24 RX ADMIN — QUETIAPINE FUMARATE 25 MG: 25 TABLET ORAL at 22:05

## 2019-06-24 RX ADMIN — KETOROLAC TROMETHAMINE 15 MG: 15 INJECTION, SOLUTION INTRAMUSCULAR; INTRAVENOUS at 22:04

## 2019-06-24 RX ADMIN — SODIUM CHLORIDE 500 ML: 9 INJECTION, SOLUTION INTRAVENOUS at 09:21

## 2019-06-24 RX ADMIN — ACETAMINOPHEN 650 MG: 325 TABLET, FILM COATED ORAL at 12:40

## 2019-06-24 RX ADMIN — TIMOLOL MALEATE 1 DROP: 5 SOLUTION OPHTHALMIC at 22:05

## 2019-06-24 RX ADMIN — LATANOPROST 1 DROP: 50 SOLUTION OPHTHALMIC at 22:05

## 2019-06-24 RX ADMIN — HALOPERIDOL LACTATE 2 MG: 5 INJECTION INTRAMUSCULAR at 14:23

## 2019-06-24 RX ADMIN — LEVOFLOXACIN 250 MG: 5 INJECTION, SOLUTION INTRAVENOUS at 22:34

## 2019-06-24 RX ADMIN — HALOPERIDOL LACTATE 2 MG: 5 INJECTION INTRAMUSCULAR at 09:21

## 2019-06-24 RX ADMIN — LORAZEPAM 1 MG: 1 TABLET ORAL at 22:05

## 2019-06-24 RX ADMIN — HYDROMORPHONE HYDROCHLORIDE 0.5 MG: 1 INJECTION, SOLUTION INTRAMUSCULAR; INTRAVENOUS; SUBCUTANEOUS at 08:01

## 2019-06-24 RX ADMIN — BRIMONIDINE TARTRATE 1 DROP: 2 SOLUTION OPHTHALMIC at 22:05

## 2019-06-24 RX ADMIN — HYDROCODONE BITARTRATE AND ACETAMINOPHEN 1 TABLET: 5; 325 TABLET ORAL at 15:53

## 2019-06-24 ASSESSMENT — PAIN SCALES - GENERAL
PAINLEVEL_OUTOF10: 7
PAINLEVEL_OUTOF10: 0
PAINLEVEL_OUTOF10: 0
PAINLEVEL_OUTOF10: 4
PAINLEVEL_OUTOF10: 5
PAINLEVEL_OUTOF10: 5
PAINLEVEL_OUTOF10: 0
PAINLEVEL_OUTOF10: 6

## 2019-06-24 ASSESSMENT — PAIN DESCRIPTION - DESCRIPTORS: DESCRIPTORS: SORE;ACHING

## 2019-06-24 ASSESSMENT — PAIN SCALES - WONG BAKER
WONGBAKER_NUMERICALRESPONSE: 2
WONGBAKER_NUMERICALRESPONSE: 2

## 2019-06-24 ASSESSMENT — PAIN DESCRIPTION - ONSET: ONSET: ON-GOING

## 2019-06-24 ASSESSMENT — PAIN DESCRIPTION - PAIN TYPE: TYPE: ACUTE PAIN

## 2019-06-24 ASSESSMENT — PAIN DESCRIPTION - ORIENTATION: ORIENTATION: LEFT

## 2019-06-24 ASSESSMENT — PAIN DESCRIPTION - FREQUENCY: FREQUENCY: CONTINUOUS

## 2019-06-24 ASSESSMENT — PAIN DESCRIPTION - LOCATION: LOCATION: BUTTOCKS;WRIST

## 2019-06-24 NOTE — PROGRESS NOTES
Department of Internal Medicine  General Internal Medicine   Progress Note      SUBJECTIVE: pain is under reasonable control , still agitation intermittently and was unable to sleep last night     History obtained from chart review and the patient  General ROS: positive for  - fatigue and malaise  negative for - chills, fever or night sweats  Psychological ROS: positive for - anxiety, disorientation, irritability and memory difficulties  negative for - behavioral disorder or hallucinations  Ophthalmic ROS: severe vision and hearing impairment   Respiratory ROS: positive for - cough, shortness of breath and tachypnea  negative for - hemoptysis or stridor  Cardiovascular ROS: positive for - dyspnea on exertion  negative for - chest pain  Gastrointestinal ROS: no abdominal pain, change in bowel habits, or black or bloody stools  Genito-Urinary ROS: no dysuria, trouble voiding, or hematuria  Musculoskeletal ROS: severe pelvic girdle and left wrist pain   Neurological ROS: no TIA or stroke symptoms  Dermatological ROS: negative    OBJECTIVE      Medications      Current Facility-Administered Medications: LORazepam (ATIVAN) tablet 0.5 mg, 0.5 mg, Oral, Nightly  ketorolac (TORADOL) injection 15 mg, 15 mg, Intravenous, Q6H PRN  HYDROcodone-acetaminophen (NORCO) 5-325 MG per tablet 1 tablet, 1 tablet, Oral, Q6H PRN  HYDROcodone-acetaminophen (NORCO) 7.5-325 MG per tablet 1 tablet, 1 tablet, Oral, Q6H PRN  ipratropium-albuterol (DUONEB) nebulizer solution 1 ampule, 1 ampule, Inhalation, Q4H PRN  levofloxacin (LEVAQUIN) 250 MG/50ML infusion 250 mg, 250 mg, Intravenous, Q24H  morphine injection 4 mg, 4 mg, Intravenous, Q30 Min PRN  acetaminophen (TYLENOL) tablet 650 mg, 650 mg, Oral, Q6H PRN  latanoprost (XALATAN) 0.005 % ophthalmic solution 1 drop, 1 drop, Both Eyes, Nightly  calcium carbonate (TUMS) chewable tablet 500 mg, 1 tablet, Oral, TID PRN  hydrocortisone 2.5 % cream, , Topical, BID  QUEtiapine (SEROQUEL) tablet 25 mg, 25 mg, Oral, Nightly  sertraline (ZOLOFT) tablet 75 mg, 75 mg, Oral, Daily  HYDROmorphone HCl PF (DILAUDID) injection 0.5 mg, 0.5 mg, Intravenous, Q4H PRN  ondansetron (ZOFRAN) injection 4 mg, 4 mg, Intravenous, Q6H PRN  enoxaparin (LOVENOX) injection 30 mg, 30 mg, Subcutaneous, Daily  brimonidine (ALPHAGAN) 0.2 % ophthalmic solution 1 drop, 1 drop, Right Eye, BID **AND** timolol (TIMOPTIC) 0.5 % ophthalmic solution 1 drop, 1 drop, Right Eye, BID  traMADol (ULTRAM) tablet 50 mg, 50 mg, Oral, Q6H PRN    Physical      Vitals: /68   Pulse 77   Temp 98.8 °F (37.1 °C) (Oral)   Resp 16   Ht 4' 9\" (1.448 m)   Wt 104 lb (47.2 kg)   SpO2 93%   BMI 22.51 kg/m²   Temp: Temp: 98.8 °F (37.1 °C)  Max: Temp  Av °F (36.7 °C)  Min: 97.3 °F (36.3 °C)  Max: 98.8 °F (37.1 °C)  Respiration range:  Resp  Avg: 15.2  Min: 14  Max: 16  Pulse Range:  Pulse  Av.9  Min: 60  Max: 77  Blood pressure range:  Systolic (40JSQ), SJS:505 , Min:92 , NKA:406   , Diastolic (28IGV), KRD:11, Min:52, Max:68    SpO2  Av.4 %  Min: 83 %  Max: 95 %    Intake/Output Summary (Last 24 hours) at 2019 2210  Last data filed at 2019 1542  Gross per 24 hour   Intake 1088 ml   Output 875 ml   Net 213 ml       Vent settings:  Pulse  Av.5  Min: 60  Max: 90  Resp  Avg: 15.7  Min: 12  Max: 20  SpO2  Av.8 %  Min: 83 %  Max: 97 %    CONSTITUTIONAL:  fatigued, somnolent, cooperative, mild distress and appears younger than stated age  EYES:  Unremarkable   NECK:  Mild JVD  and supple, symmetrical, trachea midline  BACK:  kyphotic and symmetric  LUNGS:  Poor inspiration , diminished BS   CARDIOVASCULAR:  RRR S 1 S2 and normal apical pulses  ABDOMEN:  Soft BS + ,   MUSCULOSKELETAL:  Pelvic girdle tenderness  Left wrist in splint  NEUROLOGIC:  Oriented x 1 , no acute focal sensory motor deficit   SKIN:  Warm and dry  and no bruising or bleeding    Data      Lab Results   Component Value Date    WBC 6.2 2019    HGB 9.7 (L) 06/23/2019    HCT 29.6 (L) 06/23/2019    MCV 91.0 06/23/2019     06/23/2019     Lab Results   Component Value Date     06/23/2019    K 4.9 06/23/2019     06/23/2019    CO2 20 06/23/2019    BUN 41 06/23/2019    CREATININE 1.6 06/23/2019    GLUCOSE 106 06/23/2019    CALCIUM 8.4 06/23/2019      Pain relief , repeat CXR shows Pneumonia vs edema , will add Levaquin  Family definitely is not inclined to undergo surgery  Discussed with family  And Dr Simpson Sever Problems:    Senile osteoporosis    Back pain    Hyperparathyroidism ([address]    Fracture of other parts of pelvis, initial encounter for closed fracture [facility])    Displaced physeal fracture of distal end of left radius    Sacral fracture, closed ([address]    Closed fracture of single ramus of left pubis ([address]    Closed fracture of left iliac crest ([address]    Uncontrolled pain    Unable to ambulate    Closed fracture of left wrist  Resolved Problems:    Depression    Add a night time sedative   Ct nutritional support    PT OT   SNF placement    presently no surgery intervention anticipated

## 2019-06-24 NOTE — DISCHARGE INSTR - COC
Continuity of Care Form    Patient Name: Barry Guthrie   :  1/15/1921  MRN:  [id number]    Admit date:  2019  Discharge date:  2019    Code Status Order: [facility] [address]   Advance Directives:   [facility] Documentation     Date/Time Healthcare Directive Type of Healthcare Directive Copy in [address] Agent's Name Healthcare Agent's Phone Number    19 1041  Yes, patient has an advance directive for healthcare treatment  Durable power of  for health care  Yes, copy in chart  Healthcare power of   Brielle Gutierrez  [phone] cell [phone] home          Admitting Physician:  Shelton Jay MD  PCP: Pam Lin MD    Discharging Nurse: [facility] Unit/Room#: 6LR-2696/1904-76  [address] Unit Phone Number: ***    Emergency Contact:   Extended Emergency Contact Information  Primary Emergency Contact: Nico Scott [facility] [address] [facility] Phone: [phone]  Mobile Phone: [phone]  Relation: Niece/Nephew  Secondary Emergency Contact: Cathy Lewis [facility] [address]  Phone: [phone]  Mobile Phone: [phone]  Relation: Other    Past Surgical History:  Past Surgical History:   Procedure Laterality Date    BREAST LUMPECTOMY      KNEE CARTILAGE SURGERY  1960's    LEG SURGERY  1975    severe fracture repair       Immunization History:   Immunization History   Administered Date(s) Administered    Influenza 10/15/2012, 2013    Influenza Vaccine, unspecified formulation 10/01/2014, 10/07/2015    Influenza, High Dose (Fluzone 65 yrs and older) 10/05/2016    Pneumococcal Conjugate 13-valent (Shelva Cellar) 2016    Pneumococcal Polysaccharide ([id number]) 10/12/2009       Active Problems:  Patient Active Problem List   Diagnosis Code    Senile osteoporosis M81.0    Back pain M54.9    Hyperparathyroidism ([address] E21.3    Fracture of other parts of pelvis, initial encounter for closed fracture [facility]) S32.89XA    Displaced physeal fracture of distal end of left radius S59.202A    Sacral fracture, closed ([address] S32.10XA    Closed fracture of single ramus of left pubis ([facility]) S32.592A    Closed fracture of left iliac crest ([facility]) S32.302A    Uncontrolled pain R52    Unable to ambulate R26.2    Closed fracture of left wrist S62.102A       Isolation/Infection:   Isolation          No Isolation            Nurse Assessment:  Last Vital Signs: BP (!) 152/76   Pulse 76   Temp 98.8 °F (37.1 °C) (Oral)   Resp 16   Ht 4' 9\" (1.448 m)   Wt 104 lb (47.2 kg)   SpO2 93%   BMI 22.51 kg/m²     Last documented pain score (0-10 scale): Pain Level: 5  Last Weight:   Wt Readings from Last 1 Encounters:   06/21/19 104 lb (47.2 kg)     Mental Status:  disoriented and alert    IV Access:  - None    Nursing Mobility/ADLs:  Walking   Dependent  Transfer  Dependent  Bathing  Dependent  Dressing  Dependent  Toileting  Dependent  Feeding  Dependent  Med Admin  Dependent  Med Delivery   whole and prefers mixed with apple sauce    Wound Care Documentation and Therapy:        Elimination:  Continence:   · Bowel: No  · Bladder: No  Urinary Catheter: Insertion Date: 6/21/2019  Colostomy/Ileostomy/Ileal Conduit: No       Date of Last BM: 06/26/2019      Intake/Output Summary (Last 24 hours) at 6/24/2019 1045  Last data filed at 6/24/2019 0428  Gross per 24 hour   Intake --   Output 445 ml   Net -445 ml     I/O last 3 completed shifts:  In: -   Out: [address] [Urine:445]    Safety Concerns:     Sund[employer] [place]    Impairments/Disabilities:      Vision    Nutrition Therapy:  Current Nutrition Therapy:   - Oral Diet:  Dysphagia 3 advanced    Routes of Feeding: Oral  Liquids:  Thin Liquids No straws  Daily Fluid Restriction: no  Last Modified Barium Swallow with Video (Video Swallowing Test): not done    Treatments at the Time of Hospital Discharge:   Respiratory Treatments: ***  Oxygen Therapy:  is not on home oxygen therapy. Ventilator:    - No ventilator support    Rehab Therapies: Physical Therapy, Occupational Therapy and Speech/Language Therapy  Weight Bearing Status/Restrictions: Weight bearing lower extremity; Non weight bearing through LEFT wrist; ok to weight bear through elbow using platform walker  Other Medical Equipment (for information only, NOT a DME order):  hospital bed  Other Treatments: ***    Patient's personal belongings (please select all that are sent with patient):  None    RN SIGNATURE:  Electronically signed by Katherine Cano RN on 6/26/19 at 9:52 AM    CASE MANAGEMENT/SOCIAL WORK SECTION    Inpatient Status Date: 6/21/2019    Readmission Risk Assessment Score:  Readmission Risk              Risk of Unplanned Readmission:        11           Discharging to Facility/ Agency   · Name:    [address] [place] [address]  [phone] REPORT  · Address:  · Phone:       · Fax: [phone]    Dialysis Facility (if applicable)   · Name:  · Address:  · Dialysis Schedule:  · Phone:  · Fax:    / signature: Electronically signed by Desmond Beal RN on 6/26/19 at 9:55 AM    PHYSICIAN SECTION    Prognosis: Fair    Condition at Discharge: Stable    Rehab Potential (if transferring to Rehab): Guarded    Recommended Labs or Other Treatments After Discharge: PT OT     Physician Certification: I certify the above information and transfer of Jun Barnes  is necessary for the continuing treatment of the diagnosis listed and that she requires [facility] for less 30 days.      Update Admission H&P: No change in H&P    PHYSICIAN SIGNATURE:  Electronically signed by Cherie Leon MD on 6/25/19 at 4:12 PM

## 2019-06-24 NOTE — PROGRESS NOTES
Pt combative and agitated trying to pull lines will not keep 02 in, family at bedside message sent to Dr. Metz Roads

## 2019-06-24 NOTE — PROGRESS NOTES
Physical Therapy  Facility/Department: [address] ORTHO/NEURO NURSING  Daily Treatment Note  NAME: Radha Nassar  : 1/15/1921  MRN: [id number]    Date of Service: 2019    Discharge Recommendations:    Radha Nassar scored a  on the AM-PAC short mobility form. Current research shows that an AM-PAC score of 17 or less is typically not associated with a discharge to the patient's home setting. Based on the patients AM-PAC score and their current functional mobility deficits, it is recommended that the patient have 3-5 sessions per week of Physical Therapy at d/c to increase the patients independence. PT Equipment Recommendations  Equipment Needed: No    Assessment   Body structures, Functions, Activity limitations: Decreased functional mobility ; Decreased strength;Decreased endurance;Decreased safe awareness;Decreased ROM; Decreased cognition;Decreased balance  Assessment: Patient needed decreased assistance with all tasks this date, but continues to need 2-person assist for safety and to ensure NWBing of L wrist. Continue to recommend skilled PT to address above deficits and facilitate return to baseline function  Treatment Diagnosis: Decreased strength, mobility, gait   Prognosis: Good  Decision Making: Medium Complexity  History: dementia, wrist fracture, pelvic fractures  Exam: AM PAC  Clinical Presentation: evolving due to pain and cognition   Patient Education: pt educated on safety with all transfer and bed mobility tasks. Barriers to Learning: cognitive  REQUIRES PT FOLLOW UP: Yes  Activity Tolerance  Activity Tolerance: Patient limited by pain; Patient limited by cognitive status;Treatment limited secondary to agitation     Patient Diagnosis(es): The primary encounter diagnosis was Fall from bed, initial encounter.  Diagnoses of Closed fracture of left wrist, initial encounter, Acute pain of right knee, Left elbow pain, Closed fracture of single ramus of left pubis, initial encounter ([facility] - Static: Good;-  Sitting - Dynamic: Fair;-  Standing - Static: Poor;+  Comments: Required max time and Dep of 2 for changing sheets, briefs and performing ab-care. Comment: due to family and RN expressing pt not sleeping in close to 3 days and being irratic. The decision to remain in the bed was determined, with hopes the pt will sleep tonight. AM-PAC Score  AM-PAC Inpatient Mobility Raw Score : 7 (06/24/19 1531)  AM-PAC Inpatient T-Scale Score : 26.42 (06/24/19 1531)  Mobility Inpatient CMS 0-100% Score: 92.36 (06/24/19 1531)  Mobility Inpatient CMS G-Code Modifier : CM (06/24/19 1531)        Goals  Short term goals  Time Frame for Short term goals: To be met prior to discharge  Short term goal 1: Supine to sit with mod x 1 - MET 6/2. Short term goal 2: Sit to stand with min x 1   Short term goal 3: Pt to ambulate 5 steps AD ( platform walker) or HHA and mod x 1   Short term goal 4: Pt to SPT with mod x 1   Short term goal 5: Bed mobility with min A  Long term goals  Time Frame for Long term goals : STGs = LTGs   Patient Goals   Patient goals : Pt did not state goals   No goals met this treatment. Plan    Plan  Times per week: 7  Times per day: Daily  Current Treatment Recommendations: Strengthening, Transfer Training, Balance Training, Gait Training, Functional Mobility Training, Safety Education & Training  Safety Devices  Type of devices: All fall risk precautions in place, Gait belt, Nurse notified, Call light within reach, Telesitter in use, Bed alarm in place, Left in bed  Restraints  Initially in place: No     Therapy Time   Individual Concurrent Group Co-treatment   Time In       1423   Time Out       1501   Minutes       38   Timed Code Treatment Minutes: [facility],First Floor, [address]     I agree with the above note. Goals addressed by PT.   Thanks, Osmin Dunn, DPT [id number]

## 2019-06-24 NOTE — PROGRESS NOTES
Department of Internal Medicine  General Internal Medicine   Progress Note      SUBJECTIVE: moderate pain and increased SOB .  More confused     History obtained from chart review and the patient  General ROS: positive for  - fatigue and malaise  negative for - chills, fever or night sweats  Psychological ROS: positive for - anxiety, disorientation, irritability and memory difficulties  negative for - behavioral disorder or hallucinations  Ophthalmic ROS: severe vision and hearing impairment   Respiratory ROS: positive for - cough, shortness of breath and tachypnea  negative for - hemoptysis or stridor  Cardiovascular ROS: positive for - dyspnea on exertion  negative for - chest pain  Gastrointestinal ROS: no abdominal pain, change in bowel habits, or black or bloody stools  Genito-Urinary ROS: no dysuria, trouble voiding, or hematuria  Musculoskeletal ROS: severe pelvic girdle and left wrist pain   Neurological ROS: no TIA or stroke symptoms  Dermatological ROS: negative    OBJECTIVE      Medications      Current Facility-Administered Medications: LORazepam (ATIVAN) tablet 0.5 mg, 0.5 mg, Oral, Nightly  ketorolac (TORADOL) injection 15 mg, 15 mg, Intravenous, Q6H PRN  HYDROcodone-acetaminophen (NORCO) 5-325 MG per tablet 1 tablet, 1 tablet, Oral, Q6H PRN  HYDROcodone-acetaminophen (NORCO) 7.5-325 MG per tablet 1 tablet, 1 tablet, Oral, Q6H PRN  ipratropium-albuterol (DUONEB) nebulizer solution 1 ampule, 1 ampule, Inhalation, Q4H PRN  levofloxacin (LEVAQUIN) 250 MG/50ML infusion 250 mg, 250 mg, Intravenous, Q24H  morphine injection 4 mg, 4 mg, Intravenous, Q30 Min PRN  acetaminophen (TYLENOL) tablet 650 mg, 650 mg, Oral, Q6H PRN  latanoprost (XALATAN) 0.005 % ophthalmic solution 1 drop, 1 drop, Both Eyes, Nightly  calcium carbonate (TUMS) chewable tablet 500 mg, 1 tablet, Oral, TID PRN  hydrocortisone 2.5 % cream, , Topical, BID  QUEtiapine (SEROQUEL) tablet 25 mg, 25 mg, Oral, Nightly  sertraline (ZOLOFT) tablet 75 mg, 75 mg, Oral, Daily  HYDROmorphone HCl PF (DILAUDID) injection 0.5 mg, 0.5 mg, Intravenous, Q4H PRN  ondansetron (ZOFRAN) injection 4 mg, 4 mg, Intravenous, Q6H PRN  enoxaparin (LOVENOX) injection 30 mg, 30 mg, Subcutaneous, Daily  brimonidine (ALPHAGAN) 0.2 % ophthalmic solution 1 drop, 1 drop, Right Eye, BID **AND** timolol (TIMOPTIC) 0.5 % ophthalmic solution 1 drop, 1 drop, Right Eye, BID  traMADol (ULTRAM) tablet 50 mg, 50 mg, Oral, Q6H PRN    Physical      Vitals: /68   Pulse 77   Temp 98.8 °F (37.1 °C) (Oral)   Resp 16   Ht 4' 9\" (1.448 m)   Wt 104 lb (47.2 kg)   SpO2 93%   BMI 22.51 kg/m²   Temp: Temp: 98.8 °F (37.1 °C)  Max: Temp  Av °F (36.7 °C)  Min: 97.3 °F (36.3 °C)  Max: 98.8 °F (37.1 °C)  Respiration range:  Resp  Avg: 15.2  Min: 14  Max: 16  Pulse Range:  Pulse  Av.9  Min: 60  Max: 77  Blood pressure range:  Systolic (01XGV), OPA:486 , Min:92 , FTY:810   , Diastolic (47UVY), AMK:89, Min:52, Max:68    SpO2  Av.4 %  Min: 83 %  Max: 95 %    Intake/Output Summary (Last 24 hours) at 20196  Last data filed at 2019 1542  Gross per 24 hour   Intake 1088 ml   Output 875 ml   Net 213 ml       Vent settings:  Pulse  Av.5  Min: 60  Max: 90  Resp  Avg: 15.7  Min: 12  Max: 20  SpO2  Av.8 %  Min: 83 %  Max: 97 %    CONSTITUTIONAL:  fatigued, somnolent, cooperative, mild distress and appears younger than stated age  EYES:  Unremarkable   NECK:  Mild JVD  and supple, symmetrical, trachea midline  BACK:  kyphotic and symmetric  LUNGS:  Poor inspiration , diminished BS   CARDIOVASCULAR:  RRR S 1 S2 and normal apical pulses  ABDOMEN:  Soft BS + ,   MUSCULOSKELETAL:  Pelvic girdle tenderness  Left wrist in splint  NEUROLOGIC:  Oriented x 1 , no acute focal sensory motor deficit   SKIN:  Warm and dry  and no bruising or bleeding    Data      Lab Results   Component Value Date    WBC 6.2 2019    HGB 9.7 (L) 2019    HCT 29.6 (L) 2019    MCV 91.0

## 2019-06-24 NOTE — PROGRESS NOTES
Speech Language Pathology    Attempted to see patient for dysphagia therapy, RN requests holding at this time due to agitation and Pt recently receiving medication. Will attempt to follow-up as Pt is appropriate and as schedule allows.     Sharda Humphreys M.A., [address] [place] [address]  Speech-Language Pathologist

## 2019-06-24 NOTE — PROGRESS NOTES
Occupational Therapy  Facility/Department: [address] ORTHO/NEURO NURSING  Daily Treatment Note  NAME: Adolph Goldberg  : 1/15/1921  MRN: [id number]    Date of Service: 2019    Discharge Recommendations:      Adolph Goldberg scored a 13/24 on the AM-PAC ADL Inpatient form. Current research shows that an AM-PAC score of 17 or less is typically not associated with a discharge to the patient's home setting. Based on the patients AM-PAC score and their current ADL deficits, it is recommended that the patient have 3-5 sessions per week of Occupational Therapy at d/c to increase the patients independence. Assessment   Performance deficits / Impairments: Decreased functional mobility ; Decreased safe awareness;Decreased ADL status; Decreased ROM; Decreased endurance;Decreased strength;Decreased fine motor control;Decreased high-level IADLs  Assessment: Pt is not at her baseline of occupational function as evidenced by the above deficits associated w/ L wrist and pelvis fractures. Pt would benefit from continued skilled acute OT services to address these deficits. Treatment Diagnosis: Decreased functional mobility, safety awareness, ADL status, ROM, endurance, high-level IADLs, strength, and fine motor control associated w/ wrist and pelvis fractures. Prognosis: Fair  History: Pt is 79 yo[place], independent in ADLs/IADLs prior, ambulating Mod I w/ RW. PMH: cataracts, dementia, depression, GERD, glaucoma, Kyphoscoliosis, LLE fracture, Macular Degeneration, osteoporosis  Barriers to Learning: Cognition, vision, hearing  REQUIRES OT FOLLOW UP: Yes  Activity Tolerance  Activity Tolerance: (Family and RN stated pt has not slept much in three days. The decision to leave pt in bed was decided in hopes pt would sleep )  Safety Devices  Safety Devices in place: Yes  Type of devices: All fall risk precautions in place;Gait belt;Patient at risk for falls;Call light within reach;Nurse notified; Left in bed;Bed alarm in place         Patient Diagnosis(es): The primary encounter diagnosis was Fall from bed, initial encounter. Diagnoses of Closed fracture of left wrist, initial encounter, Acute pain of right knee, Left elbow pain, Closed fracture of single ramus of left pubis, initial encounter ([facility] [address] Closed fracture of sacrum, unspecified portion of sacrum, initial encounter ([facility] [address] Senile osteoporosis, and Personal history of other drug therapy were also pertinent to this visit. has a past medical history of Allergic rhinitis, Cataract, Dementia, Depression, GERD (gastroesophageal reflux disease), Glaucoma, Kyphoscoliosis, Leg fracture, left, Macular degeneration, and Osteoporosis. has a past surgical history that includes Leg Surgery (1975); Breast lumpectomy; and Knee cartilage surgery (1960's). Restrictions  Restrictions/Precautions  Restrictions/Precautions: Weight Bearing, Fall Risk  Required Braces or Orthoses?: No  Lower Extremity Weight Bearing Restrictions  Left Lower Extremity Weight Bearing: Weight Bearing As Tolerated  Upper Extremity Weight Bearing Restrictions  Left Upper Extremity Weight Bearing: Platform  Position Activity Restriction  Other position/activity restrictions: MD recommending platform walker LLE - unable to use platform walker due to patient's height. Too tall at lowest setting. Subjective   General  Chart Reviewed: Yes  Family / Caregiver Present: (Niece and friend)  Referring Practitioner: Marge High MD  Diagnosis: L Pelvis and wrist fractures  Subjective  Subjective: Pt supine in bed with RN and family present at bedside. Family reporting pt having hallucinations and being combative earlier in the day.    Pain Assessment  Santoro-Phelan Pain Rating: Hurts a little bit  Vital Signs  Patient Currently in Pain: Yes(Pt voices that she is in pain with any sort of actvity)   Orientation  Orientation  Overall Orientation Status: Impaired  Orientation Level: Oriented to situation;Oriented to person;Disoriented to time;Disoriented to place  Objective    ADL  LE Bathing: Dependent/Total(blackwood catheter present. Lucero care provided )  LE Dressing: Dependent/Total(brief change )  Toileting: Dependent/Total(blackwood catheter)        Bed mobility  Rolling to Left: Dependent/Total(mod a of 2 x4)  Rolling to Right: Dependent/Total  Comment: Pt lying in soiled sheets upon arrival. Lucero care completed and linens changed. No further transfer completed due to pt's family and RN reported the pt has not slept in 3 days. Left comfortably in bed in hopes the pt will get sleep. Cognition  Overall Cognitive Status: Exceptions  Arousal/Alertness: Inconsistent responses to stimuli;Delayed responses to stimuli  Following Commands: Inconsistently follows commands  Attention Span: Difficulty attending to directions; Difficulty dividing attention  Memory: Decreased recall of biographical Information;Decreased short term memory;Decreased recall of recent events;Decreased recall of precautions  Safety Judgement: Decreased awareness of need for safety;Decreased awareness of need for assistance  Problem Solving: Decreased awareness of errors;Assistance required to generate solutions;Assistance required to identify errors made;Assistance required to implement solutions;Assistance required to correct errors made  Insights: Not aware of deficits  Initiation: Requires cues for all  Sequencing: Requires cues for all     Perception  Overall Perceptual Status: Impaired  Initiation: Cues to initiate tasks         Plan   Plan  Times per week: 5-7  Times per day: Daily  Current Treatment Recommendations: Strengthening, Endurance Training, Patient/Caregiver Education & Training, ROM, Self-Care / ADL, Home Management Training, Functional Mobility Training, Safety Education & Training       AM-PAC Score        AM-[facility] Inpatient Daily Activity Raw Score: 13 (06/24/19 9467)  AM-PAC Inpatient ADL T-Scale Score : 32.03 (06/24/19 1014)  ADL

## 2019-06-24 NOTE — CARE COORDINATION
PT confused and combative, not medically clear for discharge. Will need pre-cert when ready, submitted to severiano to review.     Annie Guevara MSW, [address]

## 2019-06-24 NOTE — PLAN OF CARE
Problem: Falls - Risk of:  Goal: Will remain free from falls  Description  Will remain free from falls  Outcome: Ongoing     Problem: Falls - Risk of:  Goal: Absence of physical injury  Description  Absence of physical injury  Outcome: Ongoing     Problem: Risk for Impaired Skin Integrity  Goal: Tissue integrity - skin and mucous membranes  Description  Structural intactness and normal physiological function of skin and  mucous membranes.   Outcome: Ongoing     Problem: Pain:  Goal: Control of acute pain  Description  Control of acute pain  Outcome: Ongoing

## 2019-06-25 LAB
LEFT VENTRICULAR EJECTION FRACTION MODE: NORMAL
LV EF: 65 %
LV EF: 65 %
LVEF MODALITY: NORMAL

## 2019-06-25 PROCEDURE — APPNB15 APP NON BILLABLE TIME 0-15 MINS: Performed by: NURSE PRACTITIONER

## 2019-06-25 PROCEDURE — 97530 THERAPEUTIC ACTIVITIES: CPT

## 2019-06-25 PROCEDURE — 6370000000 HC RX 637 (ALT 250 FOR IP): Performed by: INTERNAL MEDICINE

## 2019-06-25 PROCEDURE — 6360000002 HC RX W HCPCS: Performed by: INTERNAL MEDICINE

## 2019-06-25 PROCEDURE — 92526 ORAL FUNCTION THERAPY: CPT

## 2019-06-25 PROCEDURE — 1200000000 HC SEMI PRIVATE

## 2019-06-25 PROCEDURE — 93306 TTE W/DOPPLER COMPLETE: CPT

## 2019-06-25 RX ORDER — HYDROCODONE BITARTRATE AND ACETAMINOPHEN 7.5; 325 MG/1; MG/1
1 TABLET ORAL EVERY 6 HOURS PRN
Qty: 12 TABLET | Refills: 0 | Status: SHIPPED | OUTPATIENT
Start: 2019-06-25 | End: 2019-06-28

## 2019-06-25 RX ORDER — LORAZEPAM 1 MG/1
1 TABLET ORAL NIGHTLY
Qty: 14 TABLET | Refills: 0 | Status: SHIPPED | OUTPATIENT
Start: 2019-06-25 | End: 2019-07-25

## 2019-06-25 RX ORDER — LATANOPROST 50 UG/ML
1 SOLUTION/ DROPS OPHTHALMIC NIGHTLY
Qty: 1 BOTTLE | Refills: 3 | Status: SHIPPED | OUTPATIENT
Start: 2019-06-25

## 2019-06-25 RX ORDER — LEVOFLOXACIN 250 MG/1
250 TABLET ORAL DAILY
Qty: 7 TABLET | Refills: 0 | Status: SHIPPED | OUTPATIENT
Start: 2019-06-25 | End: 2019-07-05

## 2019-06-25 RX ORDER — IPRATROPIUM BROMIDE AND ALBUTEROL SULFATE 2.5; .5 MG/3ML; MG/3ML
3 SOLUTION RESPIRATORY (INHALATION) EVERY 4 HOURS PRN
Qty: 360 ML | Refills: 0 | Status: SHIPPED | OUTPATIENT
Start: 2019-06-25

## 2019-06-25 RX ADMIN — TIMOLOL MALEATE 1 DROP: 5 SOLUTION OPHTHALMIC at 20:49

## 2019-06-25 RX ADMIN — BRIMONIDINE TARTRATE 1 DROP: 2 SOLUTION OPHTHALMIC at 20:49

## 2019-06-25 RX ADMIN — QUETIAPINE FUMARATE 25 MG: 25 TABLET ORAL at 20:50

## 2019-06-25 RX ADMIN — HYDROCODONE BITARTRATE AND ACETAMINOPHEN 1 TABLET: 5; 325 TABLET ORAL at 10:43

## 2019-06-25 RX ADMIN — HYDROCODONE BITARTRATE AND ACETAMINOPHEN 1 TABLET: 5; 325 TABLET ORAL at 15:00

## 2019-06-25 RX ADMIN — LATANOPROST 1 DROP: 50 SOLUTION OPHTHALMIC at 20:49

## 2019-06-25 RX ADMIN — TRAMADOL HYDROCHLORIDE 50 MG: 50 TABLET, FILM COATED ORAL at 18:21

## 2019-06-25 RX ADMIN — LEVOFLOXACIN 250 MG: 5 INJECTION, SOLUTION INTRAVENOUS at 18:18

## 2019-06-25 ASSESSMENT — PAIN SCALES - GENERAL
PAINLEVEL_OUTOF10: 4
PAINLEVEL_OUTOF10: 6
PAINLEVEL_OUTOF10: 4
PAINLEVEL_OUTOF10: 0

## 2019-06-25 NOTE — PROGRESS NOTES
Speech Language Pathology  Dysphagia Treatment Note    Name:  Macy Boggs  :   1/15/1921  Medical Diagnosis:  Fracture of other parts of pelvis, initial encounter for closed fracture ([address] [S32.89XA]  Fracture of other parts of pelvis, initial encounter for closed fracture ([address] [S32.89XA]  Treatment Diagnosis: Mild/Moderate Oropharyngeal Dysphagia  Pain level: Pt did not report pain    Current Diet Level: Dysphagia III (Advanced) with thin liquids, no straw, meds in puree  Tolerance of Current Diet Level: Per chart review, no noted difficulty with current diet    Assessment of Texture Tolerance:  -Impressions: Pt was seen sitting upright in chair. Pt with decreased alertness this date. Pt's niece reported pt only ate ice cream and ensure today. Limited trials of thin liquids and puree were provided due to decreased alertness. Puree trials revealed reduced A-P propulsion, delayed swallow initiation and reduced laryngeal elevation via palpation. No overt clinical s/s of aspiration and penetration were assessed with puree or thin liquids. Recommend continue current diet. Diet and Treatment Recommendations:  Continue current diet     Dysphagia Goals:   1.) Pt will tolerate recommended diet without s/s of aspiration (ongoing 2019)  2.) If clinical symptoms of penetration/aspiration continue to be noted,Pt will tolerate MBS to r/o aspiration and determine appropriate diet/liquid level. (ongoing 2019)  3.) Pt will tolerate diet advance to least restricted diet, as clinically indicated, with no signs of aspiration (ongoing 2019)    Plan:  Continued daily Dysphagia treatment with goals per plan of care. Patient/Family Education:Education given to the Pt and nurse, who verbalized understanding    Discharge Recommendations:  Pt will benefit from continued skilled Speech Therapy for Dysphagia services, prior to returning home.     Timed Code Treatment: 0 minutes    Total Treatment Time: 12 SIMÓN Bales   Clinician      The speech-language pathologist was present, directed the patient's care, made skilled judgment and was responsible for assessment and treatment.     Emiliano Jenkins M.A., [address] [place] [address]  Speech-Language Pathologist

## 2019-06-25 NOTE — CARE COORDINATION
3/77/9166- Per [facility] precert has been approved. Await discharge notification.

## 2019-06-25 NOTE — PROGRESS NOTES
Department of Internal Medicine  General Internal Medicine   Progress Note      SUBJECTIVE: patient increasingly agitated and delirious     History obtained from chart review and the patient  General ROS: positive for  - fatigue and malaise  negative for - chills, fever or night sweats  Psychological ROS: positive for - anxiety, disorientation, irritability and memory difficulties  negative for - behavioral disorder or hallucinations  Ophthalmic ROS: severe vision and hearing impairment   Respiratory ROS: positive for - cough, shortness of breath and tachypnea  negative for - hemoptysis or stridor  Cardiovascular ROS: positive for - dyspnea on exertion  negative for - chest pain  Gastrointestinal ROS: no abdominal pain, change in bowel habits, or black or bloody stools  Genito-Urinary ROS: no dysuria, trouble voiding, or hematuria  Musculoskeletal ROS: severe pelvic girdle and left wrist pain   Neurological ROS: no TIA or stroke symptoms  Dermatological ROS: negative    OBJECTIVE      Medications      Current Facility-Administered Medications: haloperidol lactate (HALDOL) injection 2 mg, 2 mg, Intramuscular, Q4H PRN  LORazepam (ATIVAN) tablet 1 mg, 1 mg, Oral, Nightly  ketorolac (TORADOL) injection 15 mg, 15 mg, Intravenous, Q6H PRN  HYDROcodone-acetaminophen (NORCO) 5-325 MG per tablet 1 tablet, 1 tablet, Oral, Q6H PRN  HYDROcodone-acetaminophen (NORCO) 7.5-325 MG per tablet 1 tablet, 1 tablet, Oral, Q6H PRN  ipratropium-albuterol (DUONEB) nebulizer solution 1 ampule, 1 ampule, Inhalation, Q4H PRN  levofloxacin (LEVAQUIN) 250 MG/50ML infusion 250 mg, 250 mg, Intravenous, Q24H  morphine injection 4 mg, 4 mg, Intravenous, Q30 Min PRN  acetaminophen (TYLENOL) tablet 650 mg, 650 mg, Oral, Q6H PRN  latanoprost (XALATAN) 0.005 % ophthalmic solution 1 drop, 1 drop, Both Eyes, Nightly  calcium carbonate (TUMS) chewable tablet 500 mg, 1 tablet, Oral, TID PRN  hydrocortisone 2.5 % cream, , Topical, BID  QUEtiapine (SEROQUEL) tablet 25 mg, 25 mg, Oral, Nightly  sertraline (ZOLOFT) tablet 75 mg, 75 mg, Oral, Daily  HYDROmorphone HCl PF (DILAUDID) injection 0.5 mg, 0.5 mg, Intravenous, Q4H PRN  ondansetron (ZOFRAN) injection 4 mg, 4 mg, Intravenous, Q6H PRN  enoxaparin (LOVENOX) injection 30 mg, 30 mg, Subcutaneous, Daily  brimonidine (ALPHAGAN) 0.2 % ophthalmic solution 1 drop, 1 drop, Right Eye, BID **AND** timolol (TIMOPTIC) 0.5 % ophthalmic solution 1 drop, 1 drop, Right Eye, BID  traMADol (ULTRAM) tablet 50 mg, 50 mg, Oral, Q6H PRN    Physical      Vitals: BP (!) 162/90   Pulse 77   Temp 98.1 °F (36.7 °C)   Resp 21   Ht 4' 9\" (1.448 m)   Wt 104 lb (47.2 kg)   SpO2 97%   BMI 22.51 kg/m²   Temp: Temp: 98.1 °F (36.7 °C)  Max: Temp  Av °F (36.7 °C)  Min: 97.9 °F (36.6 °C)  Max: 98.1 °F (36.7 °C)  Respiration range:  Resp  Av.3  Min: 16  Max: 28  Pulse Range:  Pulse  Av.5  Min: 71  Max: 77  Blood pressure range:  Systolic (51OHV), XFQ:447 , Min:111 , RQX:832   , Diastolic (94DXP), JEJ:95, Min:67, Max:90    SpO2  Av.7 %  Min: 96 %  Max: 97 %    Intake/Output Summary (Last 24 hours) at 2019 1708  Last data filed at 2019 0035  Gross per 24 hour   Intake --   Output 250 ml   Net -250 ml       Vent settings:  Pulse  Av.3  Min: 60  Max: 90  Resp  Av.6  Min: 12  Max: 28  SpO2  Av.4 %  Min: 83 %  Max: 98 %    CONSTITUTIONAL:  fatigued, somnolent, cooperative, mild distress and appears younger than stated age  EYES:  Unremarkable   NECK:  Mild JVD  and supple, symmetrical, trachea midline  BACK:  kyphotic and symmetric  LUNGS:  Poor inspiration , diminished BS   CARDIOVASCULAR:  RRR S 1 S2 and normal apical pulses  ABDOMEN:  Soft BS + ,   MUSCULOSKELETAL:  Pelvic girdle tenderness  Left wrist in splint  NEUROLOGIC:  Oriented x 1 , no acute focal sensory motor deficit   SKIN:  Warm and dry  and no bruising or bleeding    Data      Lab Results   Component Value Date    WBC 6.3 2019    HGB 9.5 (L) 06/24/2019    HCT 29.1 (L) 06/24/2019    MCV 89.3 06/24/2019     06/24/2019     Lab Results   Component Value Date     06/24/2019    K 4.7 06/24/2019     06/24/2019    CO2 21 06/24/2019    BUN 38 06/24/2019    CREATININE 1.2 06/24/2019    GLUCOSE 143 06/24/2019    CALCIUM 8.6 06/24/2019      Pain relief , repeat CXR shows Pneumonia vs edema , will add Levaquin  Family definitely is not inclined to undergo surgery  Discussed with family  And Dr Francesca Marshall Problems:    Senile osteoporosis    Back pain    Hyperparathyroidism ([address]    Fracture of other parts of pelvis, initial encounter for closed fracture [facility])    Displaced physeal fracture of distal end of left radius    Sacral fracture, closed ([address]    Closed fracture of single ramus of left pubis (HCC)    Closed fracture of left iliac crest ([address]    Uncontrolled pain    Unable to ambulate    Closed fracture of left wrist  Resolved Problems:    Depression    IM Haldol  Prn  Will increase  Night time Lorazepam to 1 mg   Can go to SNF when arrangements are made , discussed with judy

## 2019-06-25 NOTE — PROGRESS NOTES
Physical Therapy  Facility/Department: [address] ORTHO/NEURO NURSING  Daily Treatment Note  NAME: Nita Lux  : 1/15/1921  MRN: [id number]    Date of Service: 2019    Discharge Recommendations:    Nita Lux scored a 7 on the AM-PAC short mobility form. Current research shows that an AM-PAC score of 17 or less is typically not associated with a discharge to the patient's home setting. Based on the patients AM-PAC score and their current functional mobility deficits, it is recommended that the patient have 3-5 sessions per week of Physical Therapy at d/c to increase the patients independence. PT Equipment Recommendations  Equipment Needed: No    Assessment   Body structures, Functions, Activity limitations: Decreased functional mobility ; Decreased strength;Decreased endurance;Decreased safe awareness;Decreased ROM; Decreased cognition;Decreased balance  Assessment: Patient needed decreased assistance with all tasks this date, but continues to need 2-person assist for safety and to ensure NWBing of L wrist. Continue to recommend skilled PT to address above deficits and facilitate return to baseline function  Treatment Diagnosis: Decreased strength, mobility, gait   Prognosis: Good  Decision Making: Medium Complexity  History: dementia, wrist fracture, pelvic fractures  Exam: AM PAC  Clinical Presentation: evolving due to pain and cognition   Patient Education: pt educated on safety with all transfer and bed mobility tasks. Barriers to Learning: cognitive  REQUIRES PT FOLLOW UP: Yes  Activity Tolerance  Activity Tolerance: Patient limited by pain; Patient limited by cognitive status     Patient Diagnosis(es): The primary encounter diagnosis was Fall from bed, initial encounter.  Diagnoses of Closed fracture of left wrist, initial encounter, Acute pain of right knee, Left elbow pain, Closed fracture of single ramus of left pubis, initial encounter ([facility] [address] Closed fracture of sacrum, unspecified minute to adjust alarm. Sat EOB ~8 minutes CGA-Dependent statically. Max A for reaching with R UE 3x, unable to reach accross midline. AM-PAC Score  AM-PAC Inpatient Mobility Raw Score : 7 (06/25/19 1405)  AM-PAC Inpatient T-Scale Score : 26.42 (06/25/19 1405)  Mobility Inpatient CMS 0-100% Score: 92.36 (06/25/19 1405)  Mobility Inpatient CMS G-Code Modifier : CM (06/25/19 1405)          Goals  Short term goals  Time Frame for Short term goals: To be met prior to discharge  Short term goal 1: Supine to sit with mod x 1 - MET 6/2. Short term goal 2: Sit to stand with min x 1   Short term goal 3: Pt to ambulate 5 steps AD ( platform walker) or HHA and mod x 1   Short term goal 4: Pt to SPT with mod x 1   Short term goal 5: Bed mobility with min A  Long term goals  Time Frame for Long term goals : STGs = LTGs   Patient Goals   Patient goals : Pt did not state goals   No goals met this treatment. Plan    Plan  Times per week: 7  Times per day: Daily  Current Treatment Recommendations: Strengthening, Transfer Training, Balance Training, Gait Training, Functional Mobility Training, Safety Education & Training  Safety Devices  Type of devices: All fall risk precautions in place, Gait belt, Nurse notified, Call light within reach, Telesitter in use, Left in chair, Chair alarm in place  Restraints  Initially in place: No     Therapy Time   Individual Concurrent Group Co-treatment   Time In       1336   Time Out       1406   Minutes       30   Timed Code Treatment Minutes: [facility], [address]       I agree with the above note. Goals addressed by PT.   Thanks, Quinton Vincent, [address] [facility], DPT [id number]

## 2019-06-25 NOTE — PROGRESS NOTES
Occupational Therapy  Facility/Department: [address] ORTHO/NEURO NURSING  Daily Treatment Note  NAME: Ed Sheppard  : 1/15/1921  MRN: [id number]    Date of Service: 2019    Discharge Recommendations:       Ed Sheppard scored a  on the AM-PAC ADL Inpatient form. Current research shows that an AM-PAC score of 17 or less is typically not associated with a discharge to the patient's home setting. Based on the patients AM-PAC score and their current ADL deficits, it is recommended that the patient have 3-5 sessions per week of Occupational Therapy at d/c to increase the patients independence. Assessment   Performance deficits / Impairments: Decreased functional mobility ; Decreased safe awareness;Decreased ADL status; Decreased ROM; Decreased endurance;Decreased strength;Decreased fine motor control;Decreased high-level IADLs  Assessment: Pt is not at her baseline of occupational function as evidenced by the above deficits associated w/ L wrist and pelvis fractures. Pt would benefit from continued skilled acute OT services to address these deficits. Treatment Diagnosis: Decreased functional mobility, safety awareness, ADL status, ROM, endurance, high-level IADLs, strength, and fine motor control associated w/ wrist and pelvis fractures. Prognosis: Fair  History: Pt is 81 yo, [place], independent in ADLs/IADLs prior, ambulating Mod I w/ RW. PMH: cataracts, dementia, depression, GERD, glaucoma, Kyphoscoliosis, LLE fracture, Macular Degeneration, osteoporosis  Patient Education: Safe transfers, OOB activity with pt's niece  Barriers to Learning: Cognition, vision, hearing   REQUIRES OT FOLLOW UP: Yes  Activity Tolerance  Activity Tolerance: Patient Tolerated treatment well;Treatment limited secondary to decreased cognition  Safety Devices  Safety Devices in place: Yes  Type of devices: All fall risk precautions in place;Call light within reach; Chair alarm in place;Gait belt;Patient at risk for falls; Left in chair         Patient Diagnosis(es): The primary encounter diagnosis was Fall from bed, initial encounter. Diagnoses of Closed fracture of left wrist, initial encounter, Acute pain of right knee, Left elbow pain, Closed fracture of single ramus of left pubis, initial encounter ([facility] [address] Closed fracture of sacrum, unspecified portion of sacrum, initial encounter ([facility] [address] Senile osteoporosis, and Personal history of other drug therapy were also pertinent to this visit. has a past medical history of Allergic rhinitis, Cataract, Dementia, Depression, GERD (gastroesophageal reflux disease), Glaucoma, Kyphoscoliosis, Leg fracture, left, Macular degeneration, and Osteoporosis. has a past surgical history that includes Leg Surgery (1975); Breast lumpectomy; and Knee cartilage surgery (1960's). Restrictions  Restrictions/Precautions  Restrictions/Precautions: Weight Bearing, Fall Risk  Required Braces or Orthoses?: No  Lower Extremity Weight Bearing Restrictions  Left Lower Extremity Weight Bearing: Weight Bearing As Tolerated  Upper Extremity Weight Bearing Restrictions  Left Upper Extremity Weight Bearing: Platform  Position Activity Restriction  Other position/activity restrictions: MD recommending platform walker LLE - unable to use platform walker due to patients height. Too tall at lowest setting. Subjective   General  Chart Reviewed: Yes  Response to previous treatment: Patient unable to report, no changes reported from family or staff  Family / Caregiver Present: Yes(Niece)  Referring Practitioner: Tabatha Stark MD  Diagnosis: L Pelvis and wrist fractures  Subjective  Subjective: Pt supine in bed with niece present in room. Pt's niece and RN agreeable to ot/pt co-treatment.    Vital Signs  Patient Currently in Pain: Other (comment)(unable to assess due to coginition status)   Orientation  Orientation  Overall Orientation Status: Impaired  Orientation Level: Oriented to person;Disoriented to place; Disoriented to time;Disoriented to situation  Objective    ADL  Toileting: Dependent/Total(blackwood catheter)  Additional Comments: Due to pt's cognitive status, ADLs were unable to be perfomed during session        Balance  Sitting Balance: Maximum assistance  Standing Balance: Unable to assess(comment)  Bed mobility  Supine to Sit: Dependent/Total(x2)  Scooting: Dependent/Total(x2)  Transfers  Transfer Comments: squat pivot transfer was performed from EOB to drop arm recliner chair with dependant/total assistance x2            Cognition  Overall Cognitive Status: Exceptions  Arousal/Alertness: Inconsistent responses to stimuli;Delayed responses to stimuli  Following Commands: Follows one step commands with increased time; Inconsistently follows commands  Attention Span: Difficulty attending to directions; Difficulty dividing attention  Memory: Decreased recall of precautions;Decreased recall of recent events;Decreased short term memory  Safety Judgement: Decreased awareness of need for safety;Decreased awareness of need for assistance  Problem Solving: Decreased awareness of errors;Assistance required to generate solutions;Assistance required to identify errors made;Assistance required to implement solutions;Assistance required to correct errors made  Insights: Not aware of deficits  Initiation: Requires cues for all  Sequencing: Requires cues for all     Perception  Overall Perceptual Status: Impaired  Initiation: Cues to initiate tasks           Plan   Plan  Times per week: 5-7  Times per day: Daily  Current Treatment Recommendations: Strengthening, Endurance Training, Patient/Caregiver Education & Training, ROM, Self-Care / ADL, Home Management Training, Functional Mobility Training, Safety Education & Training                       AM-PAC Score        AM-[facility] Inpatient Daily Activity Raw Score: 11 (06/25/19 1428)  AM-PAC Inpatient ADL T-Scale Score : 29.04 (06/25/19 1428)  ADL Inpatient CMS 0-100% Score: 70.42

## 2019-06-25 NOTE — PROGRESS NOTES
[facility] Orthopedic Surgery   Progress Note      CHIEF COMPLAINT/DIAGNOSIS:    -  LEFT pubic rami fracture  -  LEFT distal radius fracture    Patient is out of room at testing. Spoke with niece, Fredo Galvezna, who states family would like to continue non-surgical treatment of distal radius fracture.      RAFFAELE RODRIGUEZ, VICKIE-CNP  6/25/2019  9:16 AM    .

## 2019-06-26 VITALS
HEIGHT: 57 IN | BODY MASS INDEX: 22.44 KG/M2 | DIASTOLIC BLOOD PRESSURE: 79 MMHG | OXYGEN SATURATION: 93 % | TEMPERATURE: 98.9 F | SYSTOLIC BLOOD PRESSURE: 141 MMHG | HEART RATE: 92 BPM | WEIGHT: 104 LBS | RESPIRATION RATE: 18 BRPM

## 2019-06-26 PROCEDURE — 6370000000 HC RX 637 (ALT 250 FOR IP): Performed by: INTERNAL MEDICINE

## 2019-06-26 PROCEDURE — 6360000002 HC RX W HCPCS: Performed by: INTERNAL MEDICINE

## 2019-06-26 RX ADMIN — SERTRALINE 75 MG: 50 TABLET, FILM COATED ORAL at 09:24

## 2019-06-26 RX ADMIN — KETOROLAC TROMETHAMINE 15 MG: 15 INJECTION, SOLUTION INTRAMUSCULAR; INTRAVENOUS at 11:03

## 2019-06-26 RX ADMIN — TRAMADOL HYDROCHLORIDE 50 MG: 50 TABLET, FILM COATED ORAL at 10:59

## 2019-06-26 RX ADMIN — HALOPERIDOL LACTATE 2 MG: 5 INJECTION INTRAMUSCULAR at 10:32

## 2019-06-26 RX ADMIN — BRIMONIDINE TARTRATE 1 DROP: 2 SOLUTION OPHTHALMIC at 09:25

## 2019-06-26 RX ADMIN — HYDROCODONE BITARTRATE AND ACETAMINOPHEN 1 TABLET: 5; 325 TABLET ORAL at 07:03

## 2019-06-26 RX ADMIN — TIMOLOL MALEATE 1 DROP: 5 SOLUTION OPHTHALMIC at 09:24

## 2019-06-26 ASSESSMENT — PAIN SCALES - GENERAL
PAINLEVEL_OUTOF10: 7

## 2019-06-26 NOTE — CARE COORDINATION
6/26/2019-   DISCHARGE PAPERWORK COMPLETED AND FAXED TO [facility]. tRANSPORT [address].

## 2019-06-26 NOTE — PROGRESS NOTES
Shift assessment completed. Pt. VS obtained. Pt. A&O x4. Breathing easy and unlabored. Pt. On 5L of O2. Titrated down and able to get pt. To 3.5L of O2 at this time. Lungs clear but diminished. Pt. Denies pain at this time. Reviewed evening meds and POC. No questions at this time. Leggett emptied and charted. Leggett care provided w/ soap and water. Pt. Changed for incontinent BM. Repositioned w/ assist. No other needs at this time. Call light and BST within reach. Fall precautions in place, will continue to monitor. The care plan and education has been reviewed and mutually agreed upon with the patient.

## 2019-06-27 NOTE — PROGRESS NOTES
Physical Therapy  Physical Therapy Discharge Summary    Name: Charlene Chapman  : 1/15/1921    The pt was evaluated by PT on 19 and seen for 3 treatment sessions prior to DC to SNF on 19 per MD order. The pt's acute therapy goals were:  Short term goals  Time Frame for Short term goals: To be met prior to discharge  Short term goal 1: Supine to sit with mod x 1 - MET . Short term goal 2: Sit to stand with min x 1   Short term goal 3: Pt to ambulate 5 steps AD ( platform walker) or HHA and mod x 1   Short term goal 4: Pt to SPT with mod x 1   Short term goal 5: Bed mobility with min A  Long term goals  Time Frame for Long term goals : STGs = LTGs     Patient met 1 goals during stay. Number of Refusals:0  Number of Holds: 0  During this hospitalization, the patient was educated on:  Patient Education: pt educated on safety with all transfer and bed mobility tasks. DC pt from PT caseload at this time. Thank you!     [address], [place] [id number]

## 2019-06-27 NOTE — PROGRESS NOTES
Speech/Language Pathology  Discharge Note    Name: Luiz Looney   : 1/15/1921     Speech Therapy/Dysphagia  Pt discharged to [facility] on 19. Bedside Swallow Eval completed 19 (see report). No goals met prior to discharge. Recommend: Continued Dysphagia treatment at [facility], with goals and treatment per [facility]. DIET LEVEL AT DISCHARGE:  Dysphagia III (Advanced) with thin liquids, no straw, meds in puree    DYSPHAGIA GOALS:  1.) Pt will tolerate recommended diet without s/s of aspiration (GOAL NOT MET)  2.) If clinical symptoms of penetration/aspiration continue to be noted,Pt will tolerate MBS to r/o aspiration and determine appropriate diet/liquid level.  (GOAL NOT MET)  3.) Pt will tolerate diet advance to least restricted diet, as clinically indicated, with no signs of aspiration (GOAL NOT MET)    Larry Arias M.A., [address] [place] [address]  Speech-Language Pathologist

## 2019-06-28 NOTE — PROGRESS NOTES
Occupational Therapy  Occupational Therapy Discharge Summary    Name: Karla Antunez  : 1/15/1921    The pt was evaluated by OT on 19 and seen for 3 treatment sessions prior to DC to [facility] on 19 per MD order. The pt's acute therapy goals were:  Short term goals  Time Frame for Short term goals: Discharge  Short term goal 1: Mod A for UB dressing/bathing-not addressed due to cognitive status   Short term goal 2: Mod A for LB dressing/bathing- not addressed due to cognitive status   Short term goal 3: Min A for functional mobility for ADLs w/AAD-not addressed due to cognitive status   Short term goal 4: Min A for functional transfers to ADL surfaces w/AAD--squat pivot transfer dependent/total a x2        Patient met 0 goals during stay. Number of Refusals:0  Number of Holds: 0  During this hospitalization, the patient was educated on:  Patient Education: Safe transfers, OOB activity with pt's niece    DC pt from OT caseload at this time. Thank you!     Rasta Ramirez [place], OTR/L [id number]

## 2019-08-01 NOTE — PROGRESS NOTES
Patient seen , discharge dictated scripts given , arrangements made , TAMELA completed .  Discussed with nursing staff  And   If applicable ,  Discussed with  Patient's family , all questions answered and concerns addressed  When applicable

## 2019-08-01 NOTE — DISCHARGE SUMMARY
[address]                     [address] [facility], [address]                               DISCHARGE SUMMARY    PATIENT NAME: Josh Sterling                       :        01/15/1921  MED REC NO:   [id number]                          ROOM:  ACCOUNT NO:   [de-identified]                           ADMIT DATE: 2019  PROVIDER:     Mary Vizcarra MD                  DISCHARGE DATE:  2019    FINAL DIAGNOSES:  1. Fracture of the sacrum. 2.  Fracture of the pelvis, pubic and iliac crest.  3.  Dehydration. 4.  Fracture of the wrist.  5.  General weakness. 6.  Uncontrolled pain. 7.  Unable to ambulate. 8.  Hyperparathyroidism. DISCHARGE MEDICATIONS:  1. Norco 5/325 every six hours p.r.n.  2.  Ativan 1 mg nightly at bedtime p.r.n.  3.  DuoNeb 1 every four hours p.r.n.  4.  Lovenox 30 mg subcu q.24 for 14 days  5. Xalatan eyedrops one drop into both eyes nightly. 6.  Levofloxacin 250 mg times 10 days daily. 7.  Combigan eyedrops one drop into right eye every 12 hours. 8.  Hydrocortisone cream two times a day to hemorrhoids. 9.  Tylenol 650 q.4  p.r.n.  10.  Seroquel 25 at bedtime. 11.  Zoloft 50 mg 1-1/2 tablet daily. 12.  Claritin 10 mg once a day. 13.  Calcium carbonate 500 mg p.o. t.i.d.  14.  Reclast annual infusion. HOSPITAL COURSE:  This elderly woman came to the emergency room with  history of uncontrolled pain, unable to ambulate. The patient had a  fall which resulted into fracture of the sacrum and fracture of the  pelvis with iliac crest and pubis. The patient was brought in for  further management, parenteral pain management, IV hydration, PT and OT  evaluation. BUN was 31, creatinine 1.2. Sodium was 136, potassium 4.8. Anion gap was 11. GFR was 50. Troponin less than 0.01. Alkaline  phosphatase 67. Parathyroid hormone was 81.4.   CT scan of the abdomen  and pelvis showed fracture of the left iliac crest.  Orthopedic